# Patient Record
Sex: FEMALE | Race: WHITE | HISPANIC OR LATINO
[De-identification: names, ages, dates, MRNs, and addresses within clinical notes are randomized per-mention and may not be internally consistent; named-entity substitution may affect disease eponyms.]

---

## 2017-02-08 ENCOUNTER — APPOINTMENT (OUTPATIENT)
Dept: ULTRASOUND IMAGING | Facility: CLINIC | Age: 52
End: 2017-02-08

## 2017-02-08 ENCOUNTER — APPOINTMENT (OUTPATIENT)
Dept: MAMMOGRAPHY | Facility: CLINIC | Age: 52
End: 2017-02-08

## 2017-02-19 ENCOUNTER — EMERGENCY (EMERGENCY)
Facility: HOSPITAL | Age: 52
LOS: 1 days | Discharge: HOME | End: 2017-02-19
Attending: EMERGENCY MEDICINE
Payer: MEDICAID

## 2017-02-19 VITALS
SYSTOLIC BLOOD PRESSURE: 114 MMHG | RESPIRATION RATE: 18 BRPM | OXYGEN SATURATION: 100 % | TEMPERATURE: 99 F | DIASTOLIC BLOOD PRESSURE: 71 MMHG | HEART RATE: 102 BPM | HEIGHT: 60 IN | WEIGHT: 117.95 LBS

## 2017-02-19 VITALS
RESPIRATION RATE: 16 BRPM | SYSTOLIC BLOOD PRESSURE: 108 MMHG | HEART RATE: 88 BPM | OXYGEN SATURATION: 100 % | DIASTOLIC BLOOD PRESSURE: 68 MMHG

## 2017-02-19 DIAGNOSIS — L03.313 CELLULITIS OF CHEST WALL: ICD-10-CM

## 2017-02-19 DIAGNOSIS — R06.02 SHORTNESS OF BREATH: ICD-10-CM

## 2017-02-19 PROCEDURE — 93005 ELECTROCARDIOGRAM TRACING: CPT

## 2017-02-19 PROCEDURE — 99284 EMERGENCY DEPT VISIT MOD MDM: CPT

## 2017-02-19 PROCEDURE — 71046 X-RAY EXAM CHEST 2 VIEWS: CPT

## 2017-02-19 PROCEDURE — 99283 EMERGENCY DEPT VISIT LOW MDM: CPT

## 2017-02-19 PROCEDURE — 71020: CPT | Mod: 26

## 2017-02-19 RX ORDER — IBUPROFEN 200 MG
1 TABLET ORAL
Qty: 20 | Refills: 0 | OUTPATIENT
Start: 2017-02-19

## 2017-02-19 RX ADMIN — Medication 100 MILLIGRAM(S): at 19:53

## 2017-02-19 NOTE — ED PROVIDER NOTE - NS ED MD SCRIBE ATTENDING SCRIBE SECTIONS
PHYSICAL EXAM/PAST MEDICAL/SURGICAL/SOCIAL HISTORY/HISTORY OF PRESENT ILLNESS/VITAL SIGNS( Pullset)/DISPOSITION/REVIEW OF SYSTEMS/HIV

## 2017-02-19 NOTE — ED PROVIDER NOTE - RESPIRATORY, MLM
Breath sounds clear and equal bilaterally. Breath sounds clear and equal bilaterally. No respiratory distress.

## 2017-02-19 NOTE — ED PROVIDER NOTE - CHPI ED SYMPTOMS NEG
no tingling/no weakness/no fever/no numbness/no cough, no abdominal pain, no diarrhea, no back pain, no neck pain/no vomiting/no chills/no nausea

## 2017-02-19 NOTE — ED ADULT NURSE NOTE - OBJECTIVE STATEMENT
C/O PAIN TO RIGHT UPPER SCAPULA, RADIATING TO RIGHT BREAST, RIGHT ARM . SEEN AND EXAMINED BY DR. KING. CASE DISCUSSED BY DR. KING WITH PATIENT. RIGHT BREAST WITH AREA OF REDNESS,
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
## 2017-02-19 NOTE — ED PROVIDER NOTE - PHYSICAL EXAMINATION
BREAST EXAM: R: 4x4cm area erythema and induration to outer lower quadrant of R breast BREAST EXAM: R: 4x4cm area erythema, warmth, tenderness, and induration to outer lower quadrant of R breast abutting areola
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
## 2017-03-09 ENCOUNTER — APPOINTMENT (OUTPATIENT)
Dept: SURGERY | Facility: CLINIC | Age: 52
End: 2017-03-09

## 2017-03-09 VITALS
BODY MASS INDEX: 24.74 KG/M2 | SYSTOLIC BLOOD PRESSURE: 98 MMHG | HEIGHT: 60 IN | WEIGHT: 126 LBS | DIASTOLIC BLOOD PRESSURE: 60 MMHG | HEART RATE: 78 BPM

## 2018-03-16 ENCOUNTER — APPOINTMENT (OUTPATIENT)
Dept: ULTRASOUND IMAGING | Facility: CLINIC | Age: 53
End: 2018-03-16
Payer: MEDICAID

## 2018-03-16 ENCOUNTER — APPOINTMENT (OUTPATIENT)
Dept: MAMMOGRAPHY | Facility: CLINIC | Age: 53
End: 2018-03-16
Payer: MEDICAID

## 2018-03-16 ENCOUNTER — OUTPATIENT (OUTPATIENT)
Dept: OUTPATIENT SERVICES | Facility: HOSPITAL | Age: 53
LOS: 1 days | End: 2018-03-16

## 2018-03-16 PROCEDURE — 76641 ULTRASOUND BREAST COMPLETE: CPT | Mod: 26,50

## 2018-03-16 PROCEDURE — 77063 BREAST TOMOSYNTHESIS BI: CPT | Mod: 26

## 2018-03-16 PROCEDURE — 77067 SCR MAMMO BI INCL CAD: CPT | Mod: 26

## 2018-03-28 ENCOUNTER — APPOINTMENT (OUTPATIENT)
Dept: ULTRASOUND IMAGING | Facility: CLINIC | Age: 53
End: 2018-03-28
Payer: MEDICAID

## 2018-03-28 PROCEDURE — 19000 PUNCTURE ASPIR CYST BREAST: CPT | Mod: LT

## 2018-03-28 PROCEDURE — 76942 ECHO GUIDE FOR BIOPSY: CPT | Mod: 26

## 2019-03-22 ENCOUNTER — APPOINTMENT (OUTPATIENT)
Dept: MAMMOGRAPHY | Facility: CLINIC | Age: 54
End: 2019-03-22

## 2019-03-25 ENCOUNTER — APPOINTMENT (OUTPATIENT)
Dept: MAMMOGRAPHY | Facility: CLINIC | Age: 54
End: 2019-03-25
Payer: MEDICAID

## 2019-03-25 ENCOUNTER — OUTPATIENT (OUTPATIENT)
Dept: OUTPATIENT SERVICES | Facility: HOSPITAL | Age: 54
LOS: 1 days | End: 2019-03-25
Payer: MEDICAID

## 2019-03-25 DIAGNOSIS — Z12.31 ENCOUNTER FOR SCREENING MAMMOGRAM FOR MALIGNANT NEOPLASM OF BREAST: ICD-10-CM

## 2019-03-25 PROCEDURE — 77063 BREAST TOMOSYNTHESIS BI: CPT

## 2019-03-25 PROCEDURE — 77067 SCR MAMMO BI INCL CAD: CPT

## 2019-03-25 PROCEDURE — 77067 SCR MAMMO BI INCL CAD: CPT | Mod: 26

## 2019-03-25 PROCEDURE — 77063 BREAST TOMOSYNTHESIS BI: CPT | Mod: 26

## 2020-05-12 NOTE — ED PROVIDER NOTE - CARE PLAN
Video Preceptor Attestation:   Patient seen and discussed with the resident. I have verified the content of the note, which accurately reflects my assessment of the patient and the plan of care.   Supervising Physician:  Whitney Francois MD    Principal Discharge DX:	Cellulitis of chest wall

## 2022-08-19 ENCOUNTER — APPOINTMENT (OUTPATIENT)
Dept: MAMMOGRAPHY | Facility: HOSPITAL | Age: 57
End: 2022-08-19

## 2022-08-19 ENCOUNTER — APPOINTMENT (OUTPATIENT)
Dept: ULTRASOUND IMAGING | Facility: HOSPITAL | Age: 57
End: 2022-08-19

## 2022-08-19 ENCOUNTER — OUTPATIENT (OUTPATIENT)
Dept: OUTPATIENT SERVICES | Facility: HOSPITAL | Age: 57
LOS: 1 days | End: 2022-08-19
Payer: MEDICAID

## 2022-08-19 PROCEDURE — 77063 BREAST TOMOSYNTHESIS BI: CPT | Mod: 26

## 2022-08-19 PROCEDURE — 77080 DXA BONE DENSITY AXIAL: CPT

## 2022-08-19 PROCEDURE — 77067 SCR MAMMO BI INCL CAD: CPT

## 2022-08-19 PROCEDURE — 77080 DXA BONE DENSITY AXIAL: CPT | Mod: 26

## 2022-08-19 PROCEDURE — 77067 SCR MAMMO BI INCL CAD: CPT | Mod: 26

## 2022-08-19 PROCEDURE — 77063 BREAST TOMOSYNTHESIS BI: CPT

## 2022-09-02 ENCOUNTER — APPOINTMENT (OUTPATIENT)
Dept: MAMMOGRAPHY | Facility: HOSPITAL | Age: 57
End: 2022-09-02

## 2022-09-02 ENCOUNTER — APPOINTMENT (OUTPATIENT)
Dept: ULTRASOUND IMAGING | Facility: HOSPITAL | Age: 57
End: 2022-09-02

## 2022-09-02 ENCOUNTER — OUTPATIENT (OUTPATIENT)
Dept: OUTPATIENT SERVICES | Facility: HOSPITAL | Age: 57
LOS: 1 days | End: 2022-09-02
Payer: MEDICAID

## 2022-09-02 PROCEDURE — 77065 DX MAMMO INCL CAD UNI: CPT | Mod: 26,RT

## 2022-09-02 PROCEDURE — 76641 ULTRASOUND BREAST COMPLETE: CPT | Mod: 26,50

## 2022-09-02 PROCEDURE — G0279: CPT

## 2022-09-02 PROCEDURE — 77065 DX MAMMO INCL CAD UNI: CPT

## 2022-09-02 PROCEDURE — 76641 ULTRASOUND BREAST COMPLETE: CPT

## 2022-09-02 PROCEDURE — G0279: CPT | Mod: 26

## 2022-09-07 DIAGNOSIS — N60.02 SOLITARY CYST OF LEFT BREAST: ICD-10-CM

## 2022-09-07 DIAGNOSIS — N64.89 OTHER SPECIFIED DISORDERS OF BREAST: ICD-10-CM

## 2022-09-07 DIAGNOSIS — R92.2 INCONCLUSIVE MAMMOGRAM: ICD-10-CM

## 2022-09-07 DIAGNOSIS — N60.01 SOLITARY CYST OF RIGHT BREAST: ICD-10-CM

## 2022-11-18 ENCOUNTER — APPOINTMENT (OUTPATIENT)
Dept: MRI IMAGING | Facility: HOSPITAL | Age: 57
End: 2022-11-18

## 2022-11-18 ENCOUNTER — OUTPATIENT (OUTPATIENT)
Dept: OUTPATIENT SERVICES | Facility: HOSPITAL | Age: 57
LOS: 1 days | End: 2022-11-18
Payer: MEDICAID

## 2022-11-18 PROCEDURE — C8937: CPT

## 2022-11-18 PROCEDURE — A9585: CPT

## 2022-11-18 PROCEDURE — C8908: CPT

## 2022-11-18 PROCEDURE — 77049 MRI BREAST C-+ W/CAD BI: CPT | Mod: 26

## 2024-01-05 ENCOUNTER — EMERGENCY (EMERGENCY)
Facility: HOSPITAL | Age: 59
LOS: 1 days | Discharge: HOME | End: 2024-01-05
Attending: EMERGENCY MEDICINE
Payer: MEDICAID

## 2024-01-05 VITALS
WEIGHT: 164.91 LBS | TEMPERATURE: 98 F | OXYGEN SATURATION: 97 % | SYSTOLIC BLOOD PRESSURE: 140 MMHG | HEART RATE: 113 BPM | DIASTOLIC BLOOD PRESSURE: 84 MMHG | RESPIRATION RATE: 20 BRPM

## 2024-01-05 LAB
ALBUMIN SERPL ELPH-MCNC: 4.1 G/DL — SIGNIFICANT CHANGE UP (ref 3.3–5)
ALBUMIN SERPL ELPH-MCNC: 4.1 G/DL — SIGNIFICANT CHANGE UP (ref 3.3–5)
ALP SERPL-CCNC: 79 U/L — SIGNIFICANT CHANGE UP (ref 40–120)
ALP SERPL-CCNC: 79 U/L — SIGNIFICANT CHANGE UP (ref 40–120)
ALT FLD-CCNC: 40 U/L — SIGNIFICANT CHANGE UP (ref 10–45)
ALT FLD-CCNC: 40 U/L — SIGNIFICANT CHANGE UP (ref 10–45)
ANION GAP SERPL CALC-SCNC: 10 MMOL/L — SIGNIFICANT CHANGE UP (ref 5–17)
ANION GAP SERPL CALC-SCNC: 10 MMOL/L — SIGNIFICANT CHANGE UP (ref 5–17)
APPEARANCE UR: CLEAR — SIGNIFICANT CHANGE UP
APPEARANCE UR: CLEAR — SIGNIFICANT CHANGE UP
AST SERPL-CCNC: 29 U/L — SIGNIFICANT CHANGE UP (ref 10–40)
AST SERPL-CCNC: 29 U/L — SIGNIFICANT CHANGE UP (ref 10–40)
BACTERIA # UR AUTO: NEGATIVE /HPF — SIGNIFICANT CHANGE UP
BACTERIA # UR AUTO: NEGATIVE /HPF — SIGNIFICANT CHANGE UP
BASOPHILS # BLD AUTO: 0.05 K/UL — SIGNIFICANT CHANGE UP (ref 0–0.2)
BASOPHILS # BLD AUTO: 0.05 K/UL — SIGNIFICANT CHANGE UP (ref 0–0.2)
BASOPHILS NFR BLD AUTO: 0.6 % — SIGNIFICANT CHANGE UP (ref 0–2)
BASOPHILS NFR BLD AUTO: 0.6 % — SIGNIFICANT CHANGE UP (ref 0–2)
BILIRUB SERPL-MCNC: 0.4 MG/DL — SIGNIFICANT CHANGE UP (ref 0.2–1.2)
BILIRUB SERPL-MCNC: 0.4 MG/DL — SIGNIFICANT CHANGE UP (ref 0.2–1.2)
BILIRUB UR-MCNC: NEGATIVE — SIGNIFICANT CHANGE UP
BILIRUB UR-MCNC: NEGATIVE — SIGNIFICANT CHANGE UP
BUN SERPL-MCNC: 17 MG/DL — SIGNIFICANT CHANGE UP (ref 7–23)
BUN SERPL-MCNC: 17 MG/DL — SIGNIFICANT CHANGE UP (ref 7–23)
CALCIUM SERPL-MCNC: 10 MG/DL — SIGNIFICANT CHANGE UP (ref 8.4–10.5)
CALCIUM SERPL-MCNC: 10 MG/DL — SIGNIFICANT CHANGE UP (ref 8.4–10.5)
CAST: 0 /LPF — SIGNIFICANT CHANGE UP (ref 0–4)
CAST: 0 /LPF — SIGNIFICANT CHANGE UP (ref 0–4)
CHLORIDE SERPL-SCNC: 106 MMOL/L — SIGNIFICANT CHANGE UP (ref 96–108)
CHLORIDE SERPL-SCNC: 106 MMOL/L — SIGNIFICANT CHANGE UP (ref 96–108)
CO2 SERPL-SCNC: 25 MMOL/L — SIGNIFICANT CHANGE UP (ref 22–31)
CO2 SERPL-SCNC: 25 MMOL/L — SIGNIFICANT CHANGE UP (ref 22–31)
COLOR SPEC: YELLOW — SIGNIFICANT CHANGE UP
COLOR SPEC: YELLOW — SIGNIFICANT CHANGE UP
CREAT SERPL-MCNC: 0.73 MG/DL — SIGNIFICANT CHANGE UP (ref 0.5–1.3)
CREAT SERPL-MCNC: 0.73 MG/DL — SIGNIFICANT CHANGE UP (ref 0.5–1.3)
DIFF PNL FLD: ABNORMAL
DIFF PNL FLD: ABNORMAL
EGFR: 95 ML/MIN/1.73M2 — SIGNIFICANT CHANGE UP
EGFR: 95 ML/MIN/1.73M2 — SIGNIFICANT CHANGE UP
EOSINOPHIL # BLD AUTO: 0.33 K/UL — SIGNIFICANT CHANGE UP (ref 0–0.5)
EOSINOPHIL # BLD AUTO: 0.33 K/UL — SIGNIFICANT CHANGE UP (ref 0–0.5)
EOSINOPHIL NFR BLD AUTO: 4.2 % — SIGNIFICANT CHANGE UP (ref 0–6)
EOSINOPHIL NFR BLD AUTO: 4.2 % — SIGNIFICANT CHANGE UP (ref 0–6)
GLUCOSE SERPL-MCNC: 92 MG/DL — SIGNIFICANT CHANGE UP (ref 70–99)
GLUCOSE SERPL-MCNC: 92 MG/DL — SIGNIFICANT CHANGE UP (ref 70–99)
GLUCOSE UR QL: NEGATIVE MG/DL — SIGNIFICANT CHANGE UP
GLUCOSE UR QL: NEGATIVE MG/DL — SIGNIFICANT CHANGE UP
HCT VFR BLD CALC: 42 % — SIGNIFICANT CHANGE UP (ref 34.5–45)
HCT VFR BLD CALC: 42 % — SIGNIFICANT CHANGE UP (ref 34.5–45)
HGB BLD-MCNC: 14.1 G/DL — SIGNIFICANT CHANGE UP (ref 11.5–15.5)
HGB BLD-MCNC: 14.1 G/DL — SIGNIFICANT CHANGE UP (ref 11.5–15.5)
IMM GRANULOCYTES NFR BLD AUTO: 0.4 % — SIGNIFICANT CHANGE UP (ref 0–0.9)
IMM GRANULOCYTES NFR BLD AUTO: 0.4 % — SIGNIFICANT CHANGE UP (ref 0–0.9)
KETONES UR-MCNC: NEGATIVE MG/DL — SIGNIFICANT CHANGE UP
KETONES UR-MCNC: NEGATIVE MG/DL — SIGNIFICANT CHANGE UP
LEUKOCYTE ESTERASE UR-ACNC: NEGATIVE — SIGNIFICANT CHANGE UP
LEUKOCYTE ESTERASE UR-ACNC: NEGATIVE — SIGNIFICANT CHANGE UP
LYMPHOCYTES # BLD AUTO: 2.77 K/UL — SIGNIFICANT CHANGE UP (ref 1–3.3)
LYMPHOCYTES # BLD AUTO: 2.77 K/UL — SIGNIFICANT CHANGE UP (ref 1–3.3)
LYMPHOCYTES # BLD AUTO: 35 % — SIGNIFICANT CHANGE UP (ref 13–44)
LYMPHOCYTES # BLD AUTO: 35 % — SIGNIFICANT CHANGE UP (ref 13–44)
MCHC RBC-ENTMCNC: 29.6 PG — SIGNIFICANT CHANGE UP (ref 27–34)
MCHC RBC-ENTMCNC: 29.6 PG — SIGNIFICANT CHANGE UP (ref 27–34)
MCHC RBC-ENTMCNC: 33.6 GM/DL — SIGNIFICANT CHANGE UP (ref 32–36)
MCHC RBC-ENTMCNC: 33.6 GM/DL — SIGNIFICANT CHANGE UP (ref 32–36)
MCV RBC AUTO: 88.1 FL — SIGNIFICANT CHANGE UP (ref 80–100)
MCV RBC AUTO: 88.1 FL — SIGNIFICANT CHANGE UP (ref 80–100)
MONOCYTES # BLD AUTO: 0.57 K/UL — SIGNIFICANT CHANGE UP (ref 0–0.9)
MONOCYTES # BLD AUTO: 0.57 K/UL — SIGNIFICANT CHANGE UP (ref 0–0.9)
MONOCYTES NFR BLD AUTO: 7.2 % — SIGNIFICANT CHANGE UP (ref 2–14)
MONOCYTES NFR BLD AUTO: 7.2 % — SIGNIFICANT CHANGE UP (ref 2–14)
NEUTROPHILS # BLD AUTO: 4.16 K/UL — SIGNIFICANT CHANGE UP (ref 1.8–7.4)
NEUTROPHILS # BLD AUTO: 4.16 K/UL — SIGNIFICANT CHANGE UP (ref 1.8–7.4)
NEUTROPHILS NFR BLD AUTO: 52.6 % — SIGNIFICANT CHANGE UP (ref 43–77)
NEUTROPHILS NFR BLD AUTO: 52.6 % — SIGNIFICANT CHANGE UP (ref 43–77)
NITRITE UR-MCNC: NEGATIVE — SIGNIFICANT CHANGE UP
NITRITE UR-MCNC: NEGATIVE — SIGNIFICANT CHANGE UP
NRBC # BLD: 0 /100 WBCS — SIGNIFICANT CHANGE UP (ref 0–0)
NRBC # BLD: 0 /100 WBCS — SIGNIFICANT CHANGE UP (ref 0–0)
PH UR: 6 — SIGNIFICANT CHANGE UP (ref 5–8)
PH UR: 6 — SIGNIFICANT CHANGE UP (ref 5–8)
PLATELET # BLD AUTO: 302 K/UL — SIGNIFICANT CHANGE UP (ref 150–400)
PLATELET # BLD AUTO: 302 K/UL — SIGNIFICANT CHANGE UP (ref 150–400)
POTASSIUM SERPL-MCNC: 4.7 MMOL/L — SIGNIFICANT CHANGE UP (ref 3.5–5.3)
POTASSIUM SERPL-MCNC: 4.7 MMOL/L — SIGNIFICANT CHANGE UP (ref 3.5–5.3)
POTASSIUM SERPL-SCNC: 4.7 MMOL/L — SIGNIFICANT CHANGE UP (ref 3.5–5.3)
POTASSIUM SERPL-SCNC: 4.7 MMOL/L — SIGNIFICANT CHANGE UP (ref 3.5–5.3)
PROT SERPL-MCNC: 7 G/DL — SIGNIFICANT CHANGE UP (ref 6–8.3)
PROT SERPL-MCNC: 7 G/DL — SIGNIFICANT CHANGE UP (ref 6–8.3)
PROT UR-MCNC: NEGATIVE MG/DL — SIGNIFICANT CHANGE UP
PROT UR-MCNC: NEGATIVE MG/DL — SIGNIFICANT CHANGE UP
RBC # BLD: 4.77 M/UL — SIGNIFICANT CHANGE UP (ref 3.8–5.2)
RBC # BLD: 4.77 M/UL — SIGNIFICANT CHANGE UP (ref 3.8–5.2)
RBC # FLD: 11.9 % — SIGNIFICANT CHANGE UP (ref 10.3–14.5)
RBC # FLD: 11.9 % — SIGNIFICANT CHANGE UP (ref 10.3–14.5)
RBC CASTS # UR COMP ASSIST: 2 /HPF — SIGNIFICANT CHANGE UP (ref 0–4)
RBC CASTS # UR COMP ASSIST: 2 /HPF — SIGNIFICANT CHANGE UP (ref 0–4)
REVIEW: SIGNIFICANT CHANGE UP
REVIEW: SIGNIFICANT CHANGE UP
SODIUM SERPL-SCNC: 141 MMOL/L — SIGNIFICANT CHANGE UP (ref 135–145)
SODIUM SERPL-SCNC: 141 MMOL/L — SIGNIFICANT CHANGE UP (ref 135–145)
SP GR SPEC: 1.02 — SIGNIFICANT CHANGE UP (ref 1–1.03)
SP GR SPEC: 1.02 — SIGNIFICANT CHANGE UP (ref 1–1.03)
SQUAMOUS # UR AUTO: 1 /HPF — SIGNIFICANT CHANGE UP (ref 0–5)
SQUAMOUS # UR AUTO: 1 /HPF — SIGNIFICANT CHANGE UP (ref 0–5)
UROBILINOGEN FLD QL: 0.2 MG/DL — SIGNIFICANT CHANGE UP (ref 0.2–1)
UROBILINOGEN FLD QL: 0.2 MG/DL — SIGNIFICANT CHANGE UP (ref 0.2–1)
WBC # BLD: 7.91 K/UL — SIGNIFICANT CHANGE UP (ref 3.8–10.5)
WBC # BLD: 7.91 K/UL — SIGNIFICANT CHANGE UP (ref 3.8–10.5)
WBC # FLD AUTO: 7.91 K/UL — SIGNIFICANT CHANGE UP (ref 3.8–10.5)
WBC # FLD AUTO: 7.91 K/UL — SIGNIFICANT CHANGE UP (ref 3.8–10.5)
WBC UR QL: 2 /HPF — SIGNIFICANT CHANGE UP (ref 0–5)
WBC UR QL: 2 /HPF — SIGNIFICANT CHANGE UP (ref 0–5)

## 2024-01-05 PROCEDURE — 99285 EMERGENCY DEPT VISIT HI MDM: CPT

## 2024-01-05 RX ORDER — ACETAMINOPHEN 500 MG
1000 TABLET ORAL ONCE
Refills: 0 | Status: COMPLETED | OUTPATIENT
Start: 2024-01-05 | End: 2024-01-05

## 2024-01-05 RX ORDER — SODIUM CHLORIDE 9 MG/ML
1000 INJECTION INTRAMUSCULAR; INTRAVENOUS; SUBCUTANEOUS ONCE
Refills: 0 | Status: COMPLETED | OUTPATIENT
Start: 2024-01-05 | End: 2024-01-05

## 2024-01-05 RX ORDER — FAMOTIDINE 10 MG/ML
20 INJECTION INTRAVENOUS ONCE
Refills: 0 | Status: COMPLETED | OUTPATIENT
Start: 2024-01-05 | End: 2024-01-05

## 2024-01-05 RX ADMIN — SODIUM CHLORIDE 1000 MILLILITER(S): 9 INJECTION INTRAMUSCULAR; INTRAVENOUS; SUBCUTANEOUS at 21:02

## 2024-01-05 RX ADMIN — FAMOTIDINE 20 MILLIGRAM(S): 10 INJECTION INTRAVENOUS at 21:02

## 2024-01-05 RX ADMIN — Medication 400 MILLIGRAM(S): at 21:02

## 2024-01-05 NOTE — ED ADULT NURSE NOTE - OBJECTIVE STATEMENT
58 year old female, A&Ox4, presenting to ED complaining of flank pain. States pain started this morning around 5 am. States pain was initially 8/10 but is currently 5/10. Also endorsing some nausea. Has a history of renal stones, denies any difficulty urinating or blood in the urine. Denies CP, SOB, HA, abdominal pain, difficulty urinating. Heart rate regular. Respirations clear and equal bilaterally. Abdomen soft, nontender and nondistended. Peripheral pulses strong and equal bilaterally. IV placed and labs drawn. Safety and comfort measures maintained.

## 2024-01-05 NOTE — ED PROVIDER NOTE - CLINICAL SUMMARY MEDICAL DECISION MAKING FREE TEXT BOX
Attending note.  See differential above.  Labs, lipase, urinalysis, analgesia, CT scan with contrast, analgesia, Zofran and reassess.

## 2024-01-05 NOTE — ED PROVIDER NOTE - PHYSICAL EXAMINATION
Attn - alert, NAD, no pallor or jaundice, PERRL 3 mm, moist mm, skin - warm and dry, Lungs - clear, no w/r/r, good BS bilaterally, Cor - rr, no M, no rub, Abdo - ND, soft, tender abdo with max on left side, no HSM, no CVAT, no guarding or rebound. No rash or lesions on flank. no hernia.  Extremities - no edema, no calf tenderness, distal pulses intact and symmetrical, Neuro - intact and non-focal Attn - alert, NAD, no pallor or jaundice, PERRL 3 mm, moist mm, skin - warm and dry, Lungs - clear, no w/r/r, good BS bilaterally, Cor - rr, no M, no rub, Abdo - ND, soft, tender abdo with max on left side, no HSM, no CVAT, no guarding or rebound. No rash or lesions on flank. no hernia.  Extremities - no edema, no calf tenderness, distal pulses intact and symmetrical, Neuro - intact and non-focal    -Kevin Mock PA-C   GEN: Pt in NAD  PSYCH: Affect appropriate.  EYES: Sclera white w/o injection.   ENT: Head NCAT. MMM. Neck supple FROM.  RESP: CTA b/l, no wheezes, rales, or rhonchi.   CARDIAC: RRR, clear distinct S1, S2, no appreciable murmurs.  ABD: Abdomen soft, +LLQ ttp. No CVAT b/l.  VASC: 2+ radial and dorsalis pedis pulses b/l. No edema or calf tenderness.  SKIN: No rashes on the trunk.

## 2024-01-05 NOTE — ED PROVIDER NOTE - NSFOLLOWUPINSTRUCTIONS_ED_ALL_ED_FT
DIVERTICULITIS:  Follow-up with your primary care provider within 2-3 days.  Additionally follow-up with a gastroenterologist within 1 week.    Bring all printed results to discuss with your PCP.    Read all discharge paperwork, adhere to clear liquid diet for the remainder of today and begin soft foods when tolerated tomorrow.    Take your antibiotics as prescribed, finish the full course of antibiotics.  Continue to take all other medications as directed.    Return to the emergency room immediately if your symptoms worsen or persist, or if you have fever (100.4'F), worsening abdominal pain, vomiting and unable to keep food down, loss of consciousness (passing out) or if any other concerning or questionable symptoms.     --------------------  KIDNEY STONE:  1) Follow-up with your primary care provider within 2-3 days.       Follow-up with urology in 2-3 days.       Bring all printed results to discuss.    2) Pain can be managed with Acetaminophen 975mg (3 regular strength tablets) every 6 hours and Ibuprofen 600mg (3 regular strength pills) every 8 hours.    3) Make sure to stay hydrated. Take Flomax 0.4mg at night. Continue to strain urine, if you collect a stone bring it to your urologist for evaluation.    4) Continue to take all other medications as directed.    5) Return to the emergency room immediately if your symptoms worsen or persist, or if you have a high or concerning fever, back pain, abdominal pain, severe vomiting, difficulty urinating, pain with urination, new rash, discharge, or if any other concerning or questionable symptoms.

## 2024-01-05 NOTE — ED ADULT NURSE NOTE - NSFALLUNIVINTERV_ED_ALL_ED
Bed/Stretcher in lowest position, wheels locked, appropriate side rails in place/Call bell, personal items and telephone in reach/Instruct patient to call for assistance before getting out of bed/chair/stretcher/Non-slip footwear applied when patient is off stretcher/[place] to call system/Physically safe environment - no spills, clutter or unnecessary equipment/Purposeful proactive rounding/Room/bathroom lighting operational, light cord in reach Bed/Stretcher in lowest position, wheels locked, appropriate side rails in place/Call bell, personal items and telephone in reach/Instruct patient to call for assistance before getting out of bed/chair/stretcher/Non-slip footwear applied when patient is off stretcher/[place] to call system/Physically safe environment - no spills, clutter or unnecessary equipment/Purposeful proactive rounding/Room/bathroom lighting operational, light cord in reach

## 2024-01-05 NOTE — ED PROVIDER NOTE - PATIENT PORTAL LINK FT
You can access the FollowMyHealth Patient Portal offered by [facility] by registering at the following website: [email or web address]. By joining [facility]’s FollowMyHealth portal, you will also be able to view your health information using other applications (apps) compatible with our system. You can access the FollowMyHealth Patient Portal offered by [facility] by registering at the following website: [email or web address]. By joining [facility]’s FollowMyHealth portal, you will also be able to view your health information using other applications (apps) compatible with our system.

## 2024-01-05 NOTE — ED PROVIDER NOTE - DIFFERENTIAL DIAGNOSIS
Differential Diagnosis Patient with left flank pain with differential not limited to renal colic versus diverticulitis versus constipation versus pyelonephritis versus gastritis/ulcer

## 2024-01-05 NOTE — ED PROVIDER NOTE - PROGRESS NOTE DETAILS
[facility] ED Attending- Patient feels well, tolerating PO. Discussed lab and radiology findings with patient. Patient feels comfortable going home. Gave home care and follow up instructions. Discussed which symptoms to look out for and when to return to the ED for further evaluation. Patient given opportunity to ask questions about their medical condition and had all questions answered. [facility] ED Attending- Patient feels well, tolerating PO. Discussed lab and radiology findings with patient. Patient feels comfortable going home. Gave home care and follow up instructions. Discussed which symptoms to look out for and when to return to the ED for further evaluation. Patient given opportunity to ask questions about their medical condition and had all questions answered.

## 2024-01-05 NOTE — ED PROVIDER NOTE - OBJECTIVE STATEMENT
Attending note.  Patient was seen in [facility] #4.  Patient complains of left flank pain which began at 5 AM this morning, somewhat improved and got worse this afternoon.  Patient reports pain was 8–9/10 when arrived in the emergency department 4 hours ago.  She reports some nausea when pain was severe.  Pain is currently 5-6/10.  She denies any nausea, fevers, chills, sweats, rigors.  She does report being constipated for the last 2 days.  She has a history of renal stones in the past and initially pain felt the same.  She denies any GI bleeding, hematuria or dysuria.  She denies any vomiting.  She denies any respiratory symptoms or coughing.  She denies any abdominal surgery in the past.  She has no significant past medical history and takes no medications regularly.  She does not smoke or drink alcohol.  She has no allergies to medications.  She feels slightly distended.  She reports a normal colonoscopy 1 year ago.   #[id number] was utilized for history and to assist in physical exam Attending note.  Patient was seen in [facility] #4.  Patient complains of left flank pain which began at 5 AM this morning, somewhat improved and got worse this afternoon.  Patient reports pain was 8–9/10 when arrived in the emergency department 4 hours ago.  She reports some nausea when pain was severe.  Pain is currently 5-6/10.  She denies any nausea, fevers, chills, sweats, rigors.  She does report being constipated for the last 2 days.  She has a history of renal stones in the past and initially pain felt the same.  She denies any GI bleeding, hematuria or dysuria.  She denies any vomiting.  She denies any respiratory symptoms or coughing.  She denies any abdominal surgery in the past.  She has no significant past medical history and takes no medications regularly.  She does not smoke or drink alcohol.  She has no allergies to medications.  She feels slightly distended.  She reports a normal colonoscopy 1 year ago.   #[id number] was utilized for history and to assist in physical exam Attending note.  [facility]  Patient complains of left flank pain which began at 5 AM this morning, somewhat improved and got worse this afternoon.  Patient reports pain was 8–9/10 when arrived in the emergency department 4 hours ago.  She reports some nausea when pain was severe.  Pain is currently 5-6/10.  She denies any nausea, fevers, chills, sweats, rigors.  She does report being constipated for the last 2 days.  She has a history of renal stones in the past and initially pain felt the same.  She denies any GI bleeding, hematuria or dysuria.  She denies any vomiting.  She denies any respiratory symptoms or coughing.  She denies any abdominal surgery in the past.  She has no significant past medical history and takes no medications regularly.  She does not smoke or drink alcohol.  She has no allergies to medications.  She feels slightly distended.  She reports a normal colonoscopy 1 year ago.   #[id number] was utilized for history and to assist in physical exam    58-year-old female PMH of kidney stones all past spontaneously last episode 2 years ago, PSH of appendectomy presents emergency department complaining of sudden onset left flank and left lower quadrant pain which began at 5 AM this morning, patient reports she had another bout of severe pain at 2 PM today prompting ED visit.  Patient reports pain currently is rated a 6 out of 10.  Pain is sharp described as "inflammation".  Patient reports pain feels somewhat similar to prior episodes of kidney stone pain but is also different in some ways.  Patient reports pain may be postprandial in nature.  Patient notes when pain is severe she experiences nausea but does not have nausea at this time.  Patient also reports constipation last stool was 2 days ago, normal brown BM denies melena or hematochezia.  Patient had colonoscopy last year reportedly showing "inflammation".  Patient denies diarrhea, vomiting, dysuria, hematuria, urinary frequency, chest pain, shortness of breath, fevers, chills. Attending note.  [facility]  Patient complains of left flank pain which began at 5 AM this morning, somewhat improved and got worse this afternoon.  Patient reports pain was 8–9/10 when arrived in the emergency department 4 hours ago.  She reports some nausea when pain was severe.  Pain is currently 5-6/10.  She denies any nausea, fevers, chills, sweats, rigors.  She does report being constipated for the last 2 days.  She has a history of renal stones in the past and initially pain felt the same.  She denies any GI bleeding, hematuria or dysuria.  She denies any vomiting.  She denies any respiratory symptoms or coughing.  She denies any abdominal surgery in the past.  She has no significant past medical history and takes no medications regularly.  She does not smoke or drink alcohol.  She has no allergies to medications.  She feels slightly distended.  She reports a normal colonoscopy 1 year ago.   #[id number] was utilized for history and to assist in physical exam    58-year-old female PMH of kidney stones all past spontaneously last episode 2 years ago, PSH of appendectomy presents emergency department complaining of sudden onset left flank and left lower quadrant pain which began at 5 AM this morning, patient reports she had another bout of severe pain at 2 PM today prompting ED visit.  Patient reports pain currently is rated a 6 out of 10.  Pain is sharp described as "inflammation".  Patient reports pain feels somewhat similar to prior episodes of kidney stone pain but is also different in some ways.  Patient reports pain may be postprandial in nature.  Patient notes when pain is severe she experiences nausea but does not have nausea at this time.  Patient also reports constipation last stool was 2 days ago, normal brown BM denies melena or hematochezia.  Patient had colonoscopy last year reportedly showing "inflammation".  Patient denies diarrhea, vomiting, dysuria, hematuria, urinary frequency, chest pain, shortness of breath, fevers, chills.

## 2024-01-06 VITALS
RESPIRATION RATE: 18 BRPM | TEMPERATURE: 98 F | DIASTOLIC BLOOD PRESSURE: 69 MMHG | HEART RATE: 68 BPM | SYSTOLIC BLOOD PRESSURE: 106 MMHG | OXYGEN SATURATION: 96 %

## 2024-01-06 PROCEDURE — 74177 CT ABD & PELVIS W/CONTRAST: CPT | Mod: MA

## 2024-01-06 PROCEDURE — 96374 THER/PROPH/DIAG INJ IV PUSH: CPT | Mod: XU

## 2024-01-06 PROCEDURE — 36415 COLL VENOUS BLD VENIPUNCTURE: CPT

## 2024-01-06 PROCEDURE — 80053 COMPREHEN METABOLIC PANEL: CPT

## 2024-01-06 PROCEDURE — 87086 URINE CULTURE/COLONY COUNT: CPT

## 2024-01-06 PROCEDURE — 96375 TX/PRO/DX INJ NEW DRUG ADDON: CPT

## 2024-01-06 PROCEDURE — 74177 CT ABD & PELVIS W/CONTRAST: CPT | Mod: 26,MA

## 2024-01-06 PROCEDURE — 99284 EMERGENCY DEPT VISIT MOD MDM: CPT | Mod: 25

## 2024-01-06 PROCEDURE — 81001 URINALYSIS AUTO W/SCOPE: CPT

## 2024-01-06 PROCEDURE — 85025 COMPLETE CBC W/AUTO DIFF WBC: CPT

## 2024-01-07 LAB
CULTURE RESULTS: SIGNIFICANT CHANGE UP
CULTURE RESULTS: SIGNIFICANT CHANGE UP
SPECIMEN SOURCE: SIGNIFICANT CHANGE UP
SPECIMEN SOURCE: SIGNIFICANT CHANGE UP

## 2024-06-06 NOTE — ED ADULT NURSE NOTE - CHIEF COMPLAINT
Pt arrived to floor from PACU. Aox4, on 3L nc, drowsy. Denies pain, sob, ha, n/v/d, cp. Hemovac x2 in place, gray cath draining clear yellow urine. Denies needs ATT.    The patient is a 58y Female complaining of pain, flank.

## 2024-12-26 NOTE — ED PROVIDER NOTE - EKG #1 DATE/TIME
Pt arrives to ed via triage d.t. needing two staples removed to back of head that were placed last Friday.   19-Feb-2017 19:07

## 2025-03-24 ENCOUNTER — NON-APPOINTMENT (OUTPATIENT)
Age: 60
End: 2025-03-24

## 2025-03-24 ENCOUNTER — INPATIENT (INPATIENT)
Facility: HOSPITAL | Age: 60
LOS: 1 days | Discharge: HOME | DRG: 392 | End: 2025-03-26
Attending: STUDENT IN AN ORGANIZED HEALTH CARE EDUCATION/TRAINING PROGRAM | Admitting: STUDENT IN AN ORGANIZED HEALTH CARE EDUCATION/TRAINING PROGRAM
Payer: MEDICAID

## 2025-03-24 VITALS
WEIGHT: 128.09 LBS | DIASTOLIC BLOOD PRESSURE: 84 MMHG | OXYGEN SATURATION: 100 % | TEMPERATURE: 99 F | RESPIRATION RATE: 18 BRPM | HEART RATE: 115 BPM | HEIGHT: 59.84 IN | SYSTOLIC BLOOD PRESSURE: 122 MMHG

## 2025-03-24 LAB
ALBUMIN SERPL ELPH-MCNC: 4.3 G/DL — SIGNIFICANT CHANGE UP (ref 3.3–5)
ALP SERPL-CCNC: 139 U/L — HIGH (ref 40–120)
ALT FLD-CCNC: 403 U/L — HIGH (ref 10–45)
ANION GAP SERPL CALC-SCNC: 16 MMOL/L — SIGNIFICANT CHANGE UP (ref 5–17)
APAP SERPL-MCNC: 22 UG/ML — SIGNIFICANT CHANGE UP (ref 10–30)
APTT BLD: 31.3 SEC — SIGNIFICANT CHANGE UP (ref 24.5–35.6)
AST SERPL-CCNC: 508 U/L — HIGH (ref 10–40)
BASOPHILS # BLD AUTO: 0.02 K/UL — SIGNIFICANT CHANGE UP (ref 0–0.2)
BASOPHILS NFR BLD AUTO: 0.4 % — SIGNIFICANT CHANGE UP (ref 0–2)
BILIRUB SERPL-MCNC: 1 MG/DL — SIGNIFICANT CHANGE UP (ref 0.2–1.2)
BUN SERPL-MCNC: 11 MG/DL — SIGNIFICANT CHANGE UP (ref 7–23)
CALCIUM SERPL-MCNC: 9.2 MG/DL — SIGNIFICANT CHANGE UP (ref 8.4–10.5)
CHLORIDE SERPL-SCNC: 102 MMOL/L — SIGNIFICANT CHANGE UP (ref 96–108)
CO2 SERPL-SCNC: 20 MMOL/L — LOW (ref 22–31)
CREAT SERPL-MCNC: 0.75 MG/DL — SIGNIFICANT CHANGE UP (ref 0.5–1.3)
EGFR: 91 ML/MIN/1.73M2 — SIGNIFICANT CHANGE UP
EGFR: 91 ML/MIN/1.73M2 — SIGNIFICANT CHANGE UP
EOSINOPHIL # BLD AUTO: 0.33 K/UL — SIGNIFICANT CHANGE UP (ref 0–0.5)
EOSINOPHIL NFR BLD AUTO: 5.8 % — SIGNIFICANT CHANGE UP (ref 0–6)
FLUAV AG NPH QL: SIGNIFICANT CHANGE UP
FLUBV AG NPH QL: SIGNIFICANT CHANGE UP
GAS PNL BLDV: SIGNIFICANT CHANGE UP
GLUCOSE SERPL-MCNC: 97 MG/DL — SIGNIFICANT CHANGE UP (ref 70–99)
HCT VFR BLD CALC: 44.1 % — SIGNIFICANT CHANGE UP (ref 34.5–45)
HGB BLD-MCNC: 14.9 G/DL — SIGNIFICANT CHANGE UP (ref 11.5–15.5)
IMM GRANULOCYTES NFR BLD AUTO: 0.4 % — SIGNIFICANT CHANGE UP (ref 0–0.9)
INR BLD: 1.04 RATIO — SIGNIFICANT CHANGE UP (ref 0.85–1.16)
LYMPHOCYTES # BLD AUTO: 0.74 K/UL — LOW (ref 1–3.3)
LYMPHOCYTES # BLD AUTO: 13 % — SIGNIFICANT CHANGE UP (ref 13–44)
MCHC RBC-ENTMCNC: 29.6 PG — SIGNIFICANT CHANGE UP (ref 27–34)
MCHC RBC-ENTMCNC: 33.8 G/DL — SIGNIFICANT CHANGE UP (ref 32–36)
MCV RBC AUTO: 87.5 FL — SIGNIFICANT CHANGE UP (ref 80–100)
MONOCYTES # BLD AUTO: 0.49 K/UL — SIGNIFICANT CHANGE UP (ref 0–0.9)
MONOCYTES NFR BLD AUTO: 8.6 % — SIGNIFICANT CHANGE UP (ref 2–14)
NEUTROPHILS # BLD AUTO: 4.08 K/UL — SIGNIFICANT CHANGE UP (ref 1.8–7.4)
NEUTROPHILS NFR BLD AUTO: 71.8 % — SIGNIFICANT CHANGE UP (ref 43–77)
NRBC BLD AUTO-RTO: 0 /100 WBCS — SIGNIFICANT CHANGE UP (ref 0–0)
PLATELET # BLD AUTO: 254 K/UL — SIGNIFICANT CHANGE UP (ref 150–400)
POTASSIUM SERPL-MCNC: 3.8 MMOL/L — SIGNIFICANT CHANGE UP (ref 3.5–5.3)
POTASSIUM SERPL-SCNC: 3.8 MMOL/L — SIGNIFICANT CHANGE UP (ref 3.5–5.3)
PROT SERPL-MCNC: 7.6 G/DL — SIGNIFICANT CHANGE UP (ref 6–8.3)
PROTHROM AB SERPL-ACNC: 11.9 SEC — SIGNIFICANT CHANGE UP (ref 9.9–13.4)
RBC # BLD: 5.04 M/UL — SIGNIFICANT CHANGE UP (ref 3.8–5.2)
RBC # FLD: 12.2 % — SIGNIFICANT CHANGE UP (ref 10.3–14.5)
RSV RNA NPH QL NAA+NON-PROBE: SIGNIFICANT CHANGE UP
SARS-COV-2 RNA SPEC QL NAA+PROBE: SIGNIFICANT CHANGE UP
SODIUM SERPL-SCNC: 138 MMOL/L — SIGNIFICANT CHANGE UP (ref 135–145)
SOURCE RESPIRATORY: SIGNIFICANT CHANGE UP
WBC # BLD: 5.68 K/UL — SIGNIFICANT CHANGE UP (ref 3.8–10.5)
WBC # FLD AUTO: 5.68 K/UL — SIGNIFICANT CHANGE UP (ref 3.8–10.5)

## 2025-03-24 PROCEDURE — 74177 CT ABD & PELVIS W/CONTRAST: CPT | Mod: 26

## 2025-03-24 PROCEDURE — 99285 EMERGENCY DEPT VISIT HI MDM: CPT

## 2025-03-24 PROCEDURE — 71046 X-RAY EXAM CHEST 2 VIEWS: CPT | Mod: 26

## 2025-03-24 RX ORDER — IBUPROFEN 200 MG
600 TABLET ORAL ONCE
Refills: 0 | Status: COMPLETED | OUTPATIENT
Start: 2025-03-24 | End: 2025-03-24

## 2025-03-24 RX ORDER — ACETAMINOPHEN 500 MG/5ML
975 LIQUID (ML) ORAL ONCE
Refills: 0 | Status: COMPLETED | OUTPATIENT
Start: 2025-03-24 | End: 2025-03-24

## 2025-03-24 RX ORDER — METOCLOPRAMIDE HCL 10 MG
10 TABLET ORAL ONCE
Refills: 0 | Status: COMPLETED | OUTPATIENT
Start: 2025-03-24 | End: 2025-03-24

## 2025-03-24 RX ORDER — ONDANSETRON HCL/PF 4 MG/2 ML
4 VIAL (ML) INJECTION ONCE
Refills: 0 | Status: COMPLETED | OUTPATIENT
Start: 2025-03-24 | End: 2025-03-24

## 2025-03-24 RX ADMIN — Medication 20 MILLIGRAM(S): at 21:31

## 2025-03-24 RX ADMIN — Medication 10 MILLIGRAM(S): at 23:24

## 2025-03-24 RX ADMIN — Medication 975 MILLIGRAM(S): at 21:32

## 2025-03-24 RX ADMIN — Medication 1000 MILLILITER(S): at 21:31

## 2025-03-24 RX ADMIN — Medication 975 MILLIGRAM(S): at 22:31

## 2025-03-24 RX ADMIN — Medication 4 MILLIGRAM(S): at 21:31

## 2025-03-24 RX ADMIN — Medication 10 MILLIGRAM(S): at 23:59

## 2025-03-24 NOTE — ED ADULT NURSE NOTE - OBJECTIVE STATEMENT
59 y F (denies PMH) presented to ED for abomdinal pain. Pt ednorses diarrhea starting yesterday, following nausea and vomitting. Pt endorses decreased PO intake. Pt denies chest pain, SOB, headahces,, back pain, blood in emsis or stool, changed in vision, urinary symptoms. Pt alert & oriented x3  . able to follow commands, speech clear. Breathing spontaneous and nonlabored. Abdomen soft & nondistended, complains of pain on palpation. Pt spontaneously moving all extremities, strength 5/5 x 4 extremities. Sensory intact. . Skin warm, dry & intact. Pt in gown, stretcher locked and in lowest position . Call bell within reach and instructed on how to use.

## 2025-03-24 NOTE — ED PROVIDER NOTE - RAPID ASSESSMENT
60 y/o female PMHx GERD PSx hysterectomy, cholecystectomy now presenting to the ED with N/V/D, subjective fevers and LLQ pain x2-3 days. Denied sick contacts    VALENTÍN Yeager: Patient seen by myself in triage area for rapid assessment only. Full H&P to be performed in main emergency room by ER providers. 58 y/o female PMHx GERD PSx hysterectomy, cholecystectomy now presenting to the ED with N/V/D, subjective fevers and LLQ pain x2-3 days. Denied sick contacts    VALENTÍN Yeager: Patient seen by myself in triage area for rapid assessment only. Full H&P to be performed in main emergency room by ER providers.    Attending MD Byrd: This patient was seen and orders were placed by the PA as per our department's QPA model.  I was not consulted in regards to this patient although I was present and available in the Emergency Department to the PA.  Patient was to be sent to main ED for full medical evaluation and receiving team was to follow up on any labs, analgesia, clinical imaging ordered by the PA.  Any reassessment and disposition decisions were to be made by receiving team as clinically indicated, all decisions regarding the progression of care to be made at their discretion.  I did not perform a comprehensive history and physical on this patient.

## 2025-03-24 NOTE — ED PROVIDER NOTE - PROGRESS NOTE DETAILS
Beverly, PGY3: Patient signed out to me by day team.    58 yo F w/ PMHx of GERD on Protonix, hysterectomy, cholecystectomy, and kidney stones presents for 1 to 2 days of diarrhea, nausea, abdominal pain, and decreased p.o. intake.  Patient reports no onset events, but started to have a ~15 episodes of nonbloody/nonbilious diarrhea yesterday.  At midnight (overnight), patient took Imodium and had only 5 episodes of diarrhea today.  She reports the diarrhea now is clear and she has not been able tolerate p.o. intake as result of this.  She is also having LLQ abdominal pain that is distinct from patient's previous GERD-like symptoms.  She denies any recent travel, positions, antibiotics use, hematochezia, hematuria, fevers/chills/cough/sore throat, dysuria.  She denies any acetaminophen use, smoking, or alcohol use.  She denies any recent rashes or insect bites.    Labs remarkable for AST//403 (new LFT elevations).  Serum acetaminophen level sent (22) and acute otitis panel sent.  After extensive discussion with patient/son, patient wanted to try eating to go home with Zofran and outpatient GI follow-up.    However, after multiple attempts, patient is unable to tolerate p.o. intake secondary to persistent abdominal pain.  Will give patient Reglan/morphine to address both abdominal pain and nauseousness.  Plan for admission with GI inpatient evaluation.      Symptoms most consistent with gastroenteritis with LFT elevations.  Low concern for C. difficile considering no recent hospitalization or antibiotic use.  No utility for right upper quadrant ultrasound as patient has a history of cholecystectomy. Beverly, PGY3:  Labs remarkable for AST//403 (new LFT elevations).  Serum acetaminophen level sent (22) and acute otitis panel sent.  After extensive discussion with patient/son, patient wanted to try eating to go home with Zofran and outpatient GI follow-up.    However, after multiple attempts, patient is unable to tolerate p.o. intake secondary to persistent abdominal pain.  Will give patient Reglan/morphine to address both abdominal pain and nauseousness.  Plan for admission with GI inpatient evaluation.      Symptoms most consistent with gastroenteritis with LFT elevations.  Low concern for C. difficile considering no recent hospitalization or antibiotic use.  No utility for right upper quadrant ultrasound as patient has a history of cholecystectomy. Beverly PGY3: GI emailed.  Patient borderline hypotensive.  Will give patient Bentyl and fluids before given morphine.  Admitted to medicine. Beverly PGY3: Unable to tolerate po. GI emailed.  Patient borderline hypotensive.  Will give patient Bentyl and fluids before given morphine.  Admitted to medicine.

## 2025-03-24 NOTE — ED ADULT NURSE NOTE - NSFALLUNIVINTERV_ED_ALL_ED
Bed/Stretcher in lowest position, wheels locked, appropriate side rails in place/Call bell, personal items and telephone in reach/Instruct patient to call for assistance before getting out of bed/chair/stretcher/Non-slip footwear applied when patient is off stretcher/[place] to call system/Physically safe environment - no spills, clutter or unnecessary equipment/Purposeful proactive rounding/Room/bathroom lighting operational, light cord in reach

## 2025-03-24 NOTE — ED ADULT TRIAGE NOTE - HISTORY OF COVID-19 VACCINATION
Labor Progress Note  Patient seen, fetal heart rate and contraction pattern evaluated at 1138    Physical Exam:  Cervical Exam was examined   4 cm dilated    60% effaced    -2 station    Presenting Part: cephalic  Cervical Position: posterior  Consistency: Medium    Membranes:  Intact  Uterine Activity[de-identified] Frequency: Every 3-5 minutes  Fetal Heart Rate: Reactive    Assessment/Plan:    Reassuring fetal status, Continue plan for vaginal delivery, AROM, clear fluid, FSE and IUPC placed. Reassuring fetal status. Continue current managent.   Daniel Castillo MD  4/23/2022  12:03 PM Vaccine status unknown

## 2025-03-24 NOTE — ED PROVIDER NOTE - CLINICAL SUMMARY MEDICAL DECISION MAKING FREE TEXT BOX
58 yo F w/ PMHx of GERD on Protonix, hysterectomy, cholecystectomy, and kidney stones presents for 1 to 2 days of diarrhea, nausea, abdominal pain, and decreased p.o. intake.  Patient reports no onset events, but started to have a ~15 episodes of nonbloody/nonbilious diarrhea yesterday.  At midnight (overnight), patient took Imodium and had only 5 episodes of diarrhea today.  She reports the diarrhea now is clear and she has not been able tolerate p.o. intake as result of this.  She is also having LLQ abdominal pain that is distinct from patient's previous GERD-like symptoms.  She denies any recent travel, positions, antibiotics use, hematochezia, hematuria, fevers/chills/cough/sore throat, dysuria.  She denies any acetaminophen use, smoking, or alcohol use.  She denies any recent rashes or insect bites.    PHYSICAL EXAM:   GENERAL: mild acute distress, endomorphic body habitus  HEENT: atraumatic, normocephalic, vision grossly intact, EOMI, no conjunctivitis or discharge, hearing grossly intact, no nasal discharge or epistaxis, clear pharynx  CV: regular rate, normal rhythm, normal S1/S2, no murmurs/rubs, no cyanosis  PULM: normal work of breathing, normal O2 saturation on RA, clear breath sounds in b/l upper/lower lung fields, no crackles/rales/rhonchi/wheezing  GI: LLQ abdominal TTP, soft/nondistended abdomen, no guarding or rebound tenderness, no palpable masses  NEURO: A&Ox4, follows commands, normal speech, no focal motor or sensory deficits  MSK: no joint tenderness/swelling/erythema, ranging all extremities with no appreciable loss of ROM  EXT: no peripheral edema, no calf tenderness, no redness or swelling  SKIN: warm, dry, and intact, no rashes    Vital signs unremarkable.  Concern for gastroenteritis. Lower concern for hepatitis versus acetaminophen toxicity versus tickborne illness. 58 yo F w/ PMHx of GERD on Protonix, hysterectomy, cholecystectomy, and kidney stones presents for 1 to 2 days of diarrhea, nausea, abdominal pain, and decreased p.o. intake.  Patient reports no onset events, but started to have a ~15 episodes of nonbloody/nonbilious diarrhea yesterday.  At midnight (overnight), patient took Imodium and had only 5 episodes of diarrhea today.  She reports the diarrhea now is clear and she has not been able tolerate p.o. intake as result of this.  She is also having LLQ abdominal pain that is distinct from patient's previous GERD-like symptoms.  She denies any recent travel, positions, antibiotics use, hematochezia, hematuria, fevers/chills/cough/sore throat, dysuria.  She denies any acetaminophen use, smoking, or alcohol use.  She denies any recent rashes or insect bites.    PHYSICAL EXAM:   GENERAL: mild acute distress, endomorphic body habitus  HEENT: atraumatic, normocephalic, vision grossly intact, EOMI, no conjunctivitis or discharge, hearing grossly intact, no nasal discharge or epistaxis, clear pharynx  CV: regular rate, normal rhythm, normal S1/S2, no murmurs/rubs, no cyanosis  PULM: normal work of breathing, normal O2 saturation on RA, clear breath sounds in b/l upper/lower lung fields, no crackles/rales/rhonchi/wheezing  GI: LLQ abdominal TTP, soft/nondistended abdomen, no guarding or rebound tenderness, no palpable masses  NEURO: A&Ox4, follows commands, normal speech, no focal motor or sensory deficits  MSK: no joint tenderness/swelling/erythema, ranging all extremities with no appreciable loss of ROM  EXT: no peripheral edema, no calf tenderness, no redness or swelling  SKIN: warm, dry, and intact, no rashes    Vital signs unremarkable.  Concern for gastroenteritis. Lower concern for hepatitis versus acetaminophen toxicity versus tickborne illness.    Dr. Hernandez (Attending Physician)

## 2025-03-25 DIAGNOSIS — R74.01 ELEVATION OF LEVELS OF LIVER TRANSAMINASE LEVELS: ICD-10-CM

## 2025-03-25 DIAGNOSIS — R19.7 DIARRHEA, UNSPECIFIED: ICD-10-CM

## 2025-03-25 DIAGNOSIS — K21.9 GASTRO-ESOPHAGEAL REFLUX DISEASE WITHOUT ESOPHAGITIS: ICD-10-CM

## 2025-03-25 PROBLEM — N20.0 CALCULUS OF KIDNEY: Chronic | Status: ACTIVE | Noted: 2024-01-05

## 2025-03-25 LAB
EBV EA AB SER IA-ACNC: 53.6 U/ML — HIGH
EBV EA AB TITR SER IF: POSITIVE
EBV EA IGG SER-ACNC: POSITIVE
EBV NA IGG SER IA-ACNC: 74.1 U/ML — HIGH
EBV PATRN SPEC IB-IMP: SIGNIFICANT CHANGE UP
EBV VCA IGG AVIDITY SER QL IA: POSITIVE
EBV VCA IGM SER IA-ACNC: 11.2 U/ML — SIGNIFICANT CHANGE UP
EBV VCA IGM SER IA-ACNC: >750 U/ML — HIGH
EBV VCA IGM TITR FLD: NEGATIVE — SIGNIFICANT CHANGE UP
HAV IGM SER-ACNC: SIGNIFICANT CHANGE UP
HBV CORE IGM SER-ACNC: SIGNIFICANT CHANGE UP
HBV SURFACE AG SER-ACNC: SIGNIFICANT CHANGE UP
HCV AB S/CO SERPL IA: 0.07 S/CO — SIGNIFICANT CHANGE UP (ref 0–0.79)
HCV AB SERPL-IMP: SIGNIFICANT CHANGE UP

## 2025-03-25 PROCEDURE — 99223 1ST HOSP IP/OBS HIGH 75: CPT

## 2025-03-25 RX ORDER — MAGNESIUM, ALUMINUM HYDROXIDE 200-200 MG
30 TABLET,CHEWABLE ORAL EVERY 4 HOURS
Refills: 0 | Status: DISCONTINUED | OUTPATIENT
Start: 2025-03-25 | End: 2025-03-26

## 2025-03-25 RX ORDER — ACETAMINOPHEN 500 MG/5ML
650 LIQUID (ML) ORAL EVERY 6 HOURS
Refills: 0 | Status: DISCONTINUED | OUTPATIENT
Start: 2025-03-25 | End: 2025-03-26

## 2025-03-25 RX ORDER — SODIUM CHLORIDE 9 G/1000ML
1000 INJECTION, SOLUTION INTRAVENOUS
Refills: 0 | Status: DISCONTINUED | OUTPATIENT
Start: 2025-03-25 | End: 2025-03-26

## 2025-03-25 RX ORDER — MELATONIN 5 MG
3 TABLET ORAL AT BEDTIME
Refills: 0 | Status: DISCONTINUED | OUTPATIENT
Start: 2025-03-25 | End: 2025-03-26

## 2025-03-25 RX ORDER — ONDANSETRON HCL/PF 4 MG/2 ML
4 VIAL (ML) INJECTION EVERY 8 HOURS
Refills: 0 | Status: DISCONTINUED | OUTPATIENT
Start: 2025-03-25 | End: 2025-03-26

## 2025-03-25 RX ADMIN — Medication 650 MILLIGRAM(S): at 19:00

## 2025-03-25 RX ADMIN — Medication 650 MILLIGRAM(S): at 09:41

## 2025-03-25 RX ADMIN — Medication 40 MILLIGRAM(S): at 17:46

## 2025-03-25 RX ADMIN — Medication 1000 MILLILITER(S): at 00:26

## 2025-03-25 RX ADMIN — Medication 4 MILLIGRAM(S): at 18:01

## 2025-03-25 RX ADMIN — Medication 650 MILLIGRAM(S): at 08:40

## 2025-03-25 RX ADMIN — Medication 75 MILLILITER(S): at 08:32

## 2025-03-25 RX ADMIN — SODIUM CHLORIDE 75 MILLILITER(S): 9 INJECTION, SOLUTION INTRAVENOUS at 14:56

## 2025-03-25 RX ADMIN — Medication 650 MILLIGRAM(S): at 18:00

## 2025-03-25 NOTE — H&P ADULT - ASSESSMENT
58 yo F with hx of  s/p cholecystectomy, GERD p/w 3-4 days of diarrhea, nausea and vomiting admitted for likely gastroenteritis viral v bacterial and transaminitis

## 2025-03-25 NOTE — H&P ADULT - PROBLEM SELECTOR PLAN 1
viral v bacterial gastroenteritis  check GI PCR  if stool consistency permits can send for c diff pcr although my suspicion is quite low, monitor stool counts inpatient  CLD  zofran, PPI   CT abd pelvis with hepatic steatosis, otherwise unremarkable  f/u acute hepatitis panel  IVF

## 2025-03-25 NOTE — PATIENT PROFILE ADULT - LEGAL HELP
Patient mobility status  with no difficulty. Provider aware     I have reviewed discharge instructions with the patient.  The patient verbalized understanding.    Patient left ED via Discharge Method: ambulatory to Home with mother.    Opportunity for questions and clarification provided.     Patient given 1 scripts.         
no

## 2025-03-25 NOTE — H&P ADULT - NSHPPHYSICALEXAM_GEN_ALL_CORE
Vital Signs Last 24 Hrs  T(C): 36.5 (25 Mar 2025 05:32), Max: 37.9 (24 Mar 2025 21:40)  T(F): 97.7 (25 Mar 2025 05:32), Max: 100.3 (24 Mar 2025 21:40)  HR: 69 (25 Mar 2025 05:32) (67 - 115)  BP: 93/58 (25 Mar 2025 05:32) (91/59 - 122/84)  BP(mean): 70 (25 Mar 2025 01:30) (70 - 77)  RR: 18 (25 Mar 2025 05:32) (16 - 18)  SpO2: 95% (25 Mar 2025 05:32) (93% - 100%)    Parameters below as of 25 Mar 2025 05:32  Patient On (Oxygen Delivery Method): room air        CONSTITUTIONAL: NAD, well-developed, well-groomed  EYES: PERRL; conjunctiva and sclera clear  ENMT: Moist oral mucosa, no pharyngeal injection or exudates; normal dentition  NECK: Supple, no palpable masses;   RESPIRATORY: Normal respiratory effort; lungs are clear to auscultation bilaterally  CARDIOVASCULAR: Regular rate and rhythm, normal S1 and S2, no murmur/rub/gallop; No lower extremity edema; Peripheral pulses are 2+ bilaterally  ABDOMEN: mild TTP in LLQ,  normoactive bowel sounds, no rebound/guarding;   MUSCULOSKELETAL:   no clubbing or cyanosis of digits; no joint swelling or tenderness to palpation  PSYCH: A+O to person, place, and time; affect appropriate   SKIN: No rashes; no palpable lesions

## 2025-03-25 NOTE — H&P ADULT - PROBLEM SELECTOR PLAN 2
low hemoglobin/abnormal lab result
could be 2/2 to gastroenteritis and or tylenol use- monitor Acetominophen level  GI consulted in the ED, f./u further recs

## 2025-03-25 NOTE — CONSULT NOTE ADULT - ASSESSMENT
59F PMH GERD PSH hysterectomy, cholecystectomy, and appendectomy presenting with nonbloody watery diarrhea, nausea, and abdominal pain starting on 3/23. Physical exam positive for diffuse abdominal pain most severe in LLQ. Labs show elevated liver enzymes (  AlkP 139), bilirubin wnl. CTAP showed hepatic steatosis. Patient's presentation is most consistent with gastroenteritis (viral vs. bacterial)    #Diarrhea  Patient has had 15 episodes on Sunday, 5 episodes on Monday after taking Imodium Sunday night, and 3 episodes this morning after eating (currently on CLD).     #Transaminitis  Labs show elevated liver enzymes (  Alk P 139) bilirubin wnl. Patient does not have family history of liver disease or history of alcohol or drug use.     Recommendations:  - GI PCR  - Hepatitis workup  - Antiemetics as needed  - IV fluids as needed  - Advance diet as tolerated      59F PMH GERD PSH hysterectomy, cholecystectomy, and appendectomy presenting with nonbloody watery diarrhea, nausea, and abdominal pain starting on 3/23. Physical exam positive for diffuse abdominal pain most severe in LLQ. Labs show elevated liver enzymes (  AlkP 139), bilirubin wnl. CTAP showed hepatic steatosis. Patient's presentation is most consistent with gastroenteritis (viral vs. bacterial)    #Diarrhea  Patient has had 15 episodes on Sunday, 5 episodes on Monday after taking Imodium Sunday night, and 3 episodes this morning after eating (currently on CLD).     #Transaminitis  Labs show elevated liver enzymes (  Alk P 139) bilirubin wnl. Acetaminophen level wnl. Patient does not have family history of liver disease or history of alcohol or drug use.     Recommendations:  - GI PCR  - Hepatitis workup  - Antiemetics as needed  - IV fluids as needed  - Advance diet as tolerated      59F PMH GERD PSH hysterectomy, cholecystectomy, and appendectomy presenting with nonbloody watery diarrhea, nausea, and abdominal pain starting on 3/23. Physical exam positive for diffuse abdominal pain most severe in LLQ. Labs show elevated liver enzymes (  AlkP 139), bilirubin wnl. CTAP showed hepatic steatosis. Patient's presentation is most consistent with acute gastroenteritis (viral vs. bacterial) vs IBD vs microcolitis    #Diarrhea  Patient has had 15 episodes on Sunday, 5 episodes on Monday after taking Imodium Sunday night, and 3 episodes this morning after eating (currently on CLD).     #Transaminitis  Labs show elevated liver enzymes (  Alk P 139) bilirubin wnl. Acetaminophen level wnl. Patient does not have family history of liver disease or history of alcohol or drug use.     Recommendations:  - GI PCR, C diff  - If negative and diarrhea persists can consider colonoscopy  - Viral hepatitis workup  - Autoimmune hepatitis workup  - Antiemetics as needed  - IV fluids   - CLD in case patient requires colonscopy      59F PMH GERD PSH hysterectomy, cholecystectomy, and appendectomy presenting with nonbloody watery diarrhea, nausea, and abdominal pain starting on 3/23. Physical exam positive for diffuse abdominal pain most severe in LLQ. Labs show elevated liver enzymes (  AlkP 139), bilirubin wnl. CTAP showed hepatic steatosis. Patient's presentation is most consistent with acute gastroenteritis (viral vs. bacterial) vs IBD vs microcolitis    #Diarrhea  Patient has had 15 episodes on Sunday, 5 episodes on Monday after taking Imodium Sunday night, and 3 episodes this morning after eating (currently on CLD).     #Transaminitis  Labs show elevated liver enzymes (  Alk P 139) bilirubin wnl. Acetaminophen level wnl. Patient does not have family history of liver disease or history of alcohol or drug use. Could be secondary to diarrhea vs viral hepatitis vs autoimmune hepatitis    Recommendations:  - GI PCR, C diff  - If negative and diarrhea persists can consider colonoscopy  - Viral hepatitis workup  - Autoimmune hepatitis workup  - Trend liver enzymes  - Antiemetics as needed  - IV fluids   - CLD in case patient requires colonscopy

## 2025-03-25 NOTE — H&P ADULT - HISTORY OF PRESENT ILLNESS
58 yo F with hx of  s/p cholecystectomy, GERD p/w 3-4 days of diarrhea, nausea and vomiting. Patient states she was in her usual state of health when she began to have crampy abdominal pain with loose stools 3-4 times a day. Denies any recent antibiotics raw foods. states she only really had chicken and rice day symptoms started which is not unusual for her. She went to urgent care and had some labs drawn and her LFTs were high so they sent her to ED. She had been taking a lot of tylenol for the pain. Went to [place] in January but no symptoms then. she denies any fevers, chills, bloody stools, joint pain or rashes. this has never happened before.

## 2025-03-25 NOTE — CONSULT NOTE ADULT - ATTENDING COMMENTS
58 yo F pmh GERD on PPI, h/o ottoniel, h/o appy presenting with acute N/V/Diarrhea over past 48 hours. No other sick contacts or affected family members.  Non bloody, diarrhea up to 15+ BM per day, even 6-8 with imodium. On labs - incidentally found to have elevated LAEs (AST/ALT/Alk P) but normal liver function.  No history of elevated liver enzymes or GI/derm/rheum symptoms at home.    Imp:  Diarrhea - suspect infections.  ddx also includes IBD, Microscopic colitis  Elevated LAEs - suspect 2/2 ischemic injury in setting of dehydration.  ddx also includes DILI, AIH, Viral Hep    Plan  Get Stool PCR, C Diff  Agree with viral hep panel, AIH labs  Trend LAEs, INR  Supportive Care  Clear liquid diet (in case needs procedure thursday/friday)    GI to follow

## 2025-03-25 NOTE — CONSULT NOTE ADULT - SUBJECTIVE AND OBJECTIVE BOX
HPI: 59F PMH GERD PSH hysterectomy, cholecystectomy, and appendectomy presenting with diarrhea, nausea, and abdominal pain starting on 3/23. The pt states that 2 days ago she had 15 episodes of nonbloody diarrhea. She then took Imodium and the next day had 5 episodes of nonbloody diarrhea. Today she reports 3 episodes of nonbloody diarrhea associated with eating. She describes the diarrhea as watery, brown and clear and reports no changes since her symptoms began. She reports subjective fevers prior to coming to the ED but has been afebrile. She has crampy abdominal pain diffusely but reports that the pain is worse in the LLQ. She denies vomiting, dysuria, shortness of breath, chest pain. She reports that her urine has seemed "brown" for the past two weeks but no changes in frequency. She traveled to [place] in January and reports no bug bites or rashes. She denies sick contacts. Denies recent antibiotics. She had a colonscopy 3 years ago which was normal. Liver enzymes were elevated (  AlkP 139) with normal bilirubin. Per her PCP, she received blood work outpatient on 3/16/25 and liver enzymes were normal then.    Allergies:  No Known Allergies      Home Medications: Protonix 40 mg    Hospital Medications:  acetaminophen     Tablet .. 650 milliGRAM(s) Oral every 6 hours PRN  aluminum hydroxide/magnesium hydroxide/simethicone Suspension 30 milliLiter(s) Oral every 4 hours PRN  melatonin 3 milliGRAM(s) Oral at bedtime PRN  ondansetron Injectable 4 milliGRAM(s) IV Push every 8 hours PRN  pantoprazole  Injectable 40 milliGRAM(s) IV Push every 12 hours  sodium chloride 0.9%. 1000 milliLiter(s) IV Continuous <Continuous>      PMHX/PSHX:  GERD, Hysterectomy, Cholecystectomy, Appendectomy    Nephrolithiasis    Family history:      Father: colon cancer, diabetes  Mother: HTN, HLD  No history of liver disease in the family    Social History:   Tob: Denies  EtOH: Denies  Illicit Drugs: Denies    ROS:     General:  No wt loss, +fevers, chills, night sweats, fatigue  Eyes:  Good vision, no reported pain  ENT:  No sore throat, pain, runny nose, dysphagia  CV:  No pain, palpitations, hypo/hypertension  Pulm:  No dyspnea, cough, tachypnea, wheezing  GI:  see HPI  :  No pain, bleeding, incontinence, nocturia  Muscle:  No pain, weakness  Neuro:  No weakness, tingling, memory problems  Psych:  No fatigue, insomnia, mood problems, depression  Endocrine:  No polyuria, polydipsia, cold/heat intolerance  Heme:  No petechiae, ecchymosis, easy bruisability  Skin:  No rash, tattoos, scars, edema    PHYSICAL EXAM:     GENERAL:  No acute distress  HEENT:  NCAT, no scleral icterus   CHEST:  no respiratory distress  HEART:  Regular rate and rhythm  ABDOMEN:  Soft, non-tender, non-distended, normoactive bowel sounds,  no masses, +abdominal pain on palpation in all 4 quadrants, LLQ most severe  EXTREMITIES: No edema  SKIN:  No rash/erythema/ecchymoses/petechiae/wounds/abscess  NEURO:  Alert and oriented x 3, no asterixis    Vital Signs:  Vital Signs Last 24 Hrs  T(C): 36.4 (25 Mar 2025 11:32), Max: 37.9 (24 Mar 2025 21:40)  T(F): 97.6 (25 Mar 2025 11:32), Max: 100.3 (24 Mar 2025 21:40)  HR: 68 (25 Mar 2025 11:32) (67 - 115)  BP: 104/65 (25 Mar 2025 11:32) (91/59 - 122/84)  BP(mean): 70 (25 Mar 2025 01:30) (70 - 77)  RR: 18 (25 Mar 2025 11:32) (16 - 18)  SpO2: 94% (25 Mar 2025 11:32) (93% - 100%)    Parameters below as of 25 Mar 2025 11:32  Patient On (Oxygen Delivery Method): room air      Daily Height in cm: 152 (24 Mar 2025 17:42)    Daily     LABS:                        14.9   5.68  )-----------( 254      ( 24 Mar 2025 20:06 )             44.1     Mean Cell Volume: 87.5 fl (03-24-25 @ 20:06)    03-24    138  |  102  |  11  ----------------------------<  97  3.8   |  20[L]  |  0.75    Ca    9.2      24 Mar 2025 20:06    TPro  7.6  /  Alb  4.3  /  TBili  1.0  /  DBili  x   /  AST  508[H]  /  ALT  403[H]  /  AlkPhos  139[H]  03-24    LIVER FUNCTIONS - ( 24 Mar 2025 20:06 )  Alb: 4.3 g/dL / Pro: 7.6 g/dL / ALK PHOS: 139 U/L / ALT: 403 U/L / AST: 508 U/L / GGT: x           PT/INR - ( 24 Mar 2025 20:06 )   PT: 11.9 sec;   INR: 1.04 ratio         PTT - ( 24 Mar 2025 20:06 )  PTT:31.3 sec  Urinalysis Basic - ( 24 Mar 2025 20:06 )    Color: x / Appearance: x / SG: x / pH: x  Gluc: 97 mg/dL / Ketone: x  / Bili: x / Urobili: x   Blood: x / Protein: x / Nitrite: x   Leuk Esterase: x / RBC: x / WBC x   Sq Epi: x / Non Sq Epi: x / Bacteria: x                              14.9   5.68  )-----------( 254      ( 24 Mar 2025 20:06 )             44.1       Imaging: No acute findings. Hepatic steatosis.               HPI: 59F PMH GERD PSH hysterectomy, cholecystectomy, and appendectomy presenting with diarrhea, nausea, and abdominal pain starting on 3/23. The pt states that 2 days ago she had 15 episodes of nonbloody diarrhea. She then took Imodium and the next day had 5 episodes of nonbloody diarrhea. Today she reports 3 episodes of nonbloody diarrhea associated with eating (on CLD). She describes the diarrhea as watery, brown and clear and reports no changes since her symptoms began. She reports subjective fevers prior to coming to the ED but has been afebrile. She has crampy abdominal pain diffusely but reports that the pain is worse in the LLQ. She denies vomiting, dysuria, shortness of breath, chest pain. She reports that her urine has seemed "brown" for the past two weeks but no changes in frequency or pain. She traveled to [place] in January and reports no bug bites or rashes. She denies sick contacts. Denies recent antibiotics. She had a colonscopy 3 years ago which was normal. Liver enzymes were elevated (  AlkP 139) with normal bilirubin. Per her PCP, she received blood work outpatient on 3/16/25 and liver enzymes were normal then.    Allergies:  No Known Allergies      Home Medications: Protonix 40 mg    Hospital Medications:  acetaminophen     Tablet .. 650 milliGRAM(s) Oral every 6 hours PRN  aluminum hydroxide/magnesium hydroxide/simethicone Suspension 30 milliLiter(s) Oral every 4 hours PRN  melatonin 3 milliGRAM(s) Oral at bedtime PRN  ondansetron Injectable 4 milliGRAM(s) IV Push every 8 hours PRN  pantoprazole  Injectable 40 milliGRAM(s) IV Push every 12 hours  sodium chloride 0.9%. 1000 milliLiter(s) IV Continuous <Continuous>      PMHX/PSHX:  GERD, Hysterectomy, Cholecystectomy, Appendectomy    Nephrolithiasis    Family history:      Father: colon cancer, diabetes  Mother: HTN, HLD  No history of liver disease in the family    Social History:   Tob: Denies  EtOH: Denies  Illicit Drugs: Denies    ROS:     General:  No wt loss, +fevers, chills, night sweats, fatigue  Eyes:  Good vision, no reported pain  ENT:  No sore throat, pain, runny nose, dysphagia  CV:  No pain, palpitations, hypo/hypertension  Pulm:  No dyspnea, cough, tachypnea, wheezing  GI:  see HPI  :  No pain, bleeding, incontinence, nocturia  Muscle:  No pain, weakness  Neuro:  No weakness, tingling, memory problems  Psych:  No fatigue, insomnia, mood problems, depression  Endocrine:  No polyuria, polydipsia, cold/heat intolerance  Heme:  No petechiae, ecchymosis, easy bruisability  Skin:  No rash, tattoos, scars, edema    PHYSICAL EXAM:     GENERAL:  No acute distress  HEENT:  NCAT, no scleral icterus   CHEST:  no respiratory distress  HEART:  Regular rate and rhythm  ABDOMEN:  Soft, non-tender, non-distended, normoactive bowel sounds,  no masses, +abdominal pain on palpation in all 4 quadrants, LLQ most severe  EXTREMITIES: No edema  SKIN:  No rash/erythema/ecchymoses/petechiae/wounds/abscess  NEURO:  Alert and oriented x 3, no asterixis    Vital Signs:  Vital Signs Last 24 Hrs  T(C): 36.4 (25 Mar 2025 11:32), Max: 37.9 (24 Mar 2025 21:40)  T(F): 97.6 (25 Mar 2025 11:32), Max: 100.3 (24 Mar 2025 21:40)  HR: 68 (25 Mar 2025 11:32) (67 - 115)  BP: 104/65 (25 Mar 2025 11:32) (91/59 - 122/84)  BP(mean): 70 (25 Mar 2025 01:30) (70 - 77)  RR: 18 (25 Mar 2025 11:32) (16 - 18)  SpO2: 94% (25 Mar 2025 11:32) (93% - 100%)    Parameters below as of 25 Mar 2025 11:32  Patient On (Oxygen Delivery Method): room air      Daily Height in cm: 152 (24 Mar 2025 17:42)    Daily     LABS:                        14.9   5.68  )-----------( 254      ( 24 Mar 2025 20:06 )             44.1     Mean Cell Volume: 87.5 fl (03-24-25 @ 20:06)    03-24    138  |  102  |  11  ----------------------------<  97  3.8   |  20[L]  |  0.75    Ca    9.2      24 Mar 2025 20:06    TPro  7.6  /  Alb  4.3  /  TBili  1.0  /  DBili  x   /  AST  508[H]  /  ALT  403[H]  /  AlkPhos  139[H]  03-24    LIVER FUNCTIONS - ( 24 Mar 2025 20:06 )  Alb: 4.3 g/dL / Pro: 7.6 g/dL / ALK PHOS: 139 U/L / ALT: 403 U/L / AST: 508 U/L / GGT: x           PT/INR - ( 24 Mar 2025 20:06 )   PT: 11.9 sec;   INR: 1.04 ratio         PTT - ( 24 Mar 2025 20:06 )  PTT:31.3 sec  Urinalysis Basic - ( 24 Mar 2025 20:06 )    Color: x / Appearance: x / SG: x / pH: x  Gluc: 97 mg/dL / Ketone: x  / Bili: x / Urobili: x   Blood: x / Protein: x / Nitrite: x   Leuk Esterase: x / RBC: x / WBC x   Sq Epi: x / Non Sq Epi: x / Bacteria: x                              14.9   5.68  )-----------( 254      ( 24 Mar 2025 20:06 )             44.1       Imaging: No acute findings. Hepatic steatosis.

## 2025-03-25 NOTE — H&P ADULT - NSHPLABSRESULTS_GEN_ALL_CORE
14.9   5.68  )-----------( 254      ( 24 Mar 2025 20:06 )             44.1   03-24    138  |  102  |  11  ----------------------------<  97  3.8   |  20[L]  |  0.75    Ca    9.2      24 Mar 2025 20:06    TPro  7.6  /  Alb  4.3  /  TBili  1.0  /  DBili  x   /  AST  508[H]  /  ALT  403[H]  /  AlkPhos  139[H]  03-24      Acetaminophen Level, Serum: 22 ug/mL (03.24.25 @ 21:51)      < from: CT Abdomen and Pelvis w/ IV Cont (03.24.25 @ 20:24) >    LIVER: Hepatic steatosis.  BILE DUCTS: Within normal limits.  GALLBLADDER: Cholecystectomy.  SPLEEN: Within normal limits.  PANCREAS: Within normal limits.  ADRENALS: Within normal limits.  KIDNEYS/URETERS: Symmetric renal enhancement bilaterally. No   hydronephrosis. Few small subcentimeter renal hypodensities, too small to   characterize.    BLADDER: Evaluation limited secondary to streak artifact from right hip   arthroplasty.  REPRODUCTIVE ORGANS: Hysterectomy.    BOWEL: No bowel obstruction. Appendix is not visualized, likely status   post appendectomy.  PERITONEUM/RETROPERITONEUM: No ascites or pneumoperitoneum.  VESSELS: Within normal limits. Hepatic and portal veins and SMV are   patent. No abdominal aortic aneurysm.  LYMPH NODES: No lymphadenopathy.  ABDOMINAL WALL: Small fat-containing umbilical hernia.  BONES: Degenerative changes. Right hip arthroplasty creating streak   artifact in the pelvis.    IMPRESSION:  No acute findings.    Hepatic steatosis.    < end of copied text >

## 2025-03-26 ENCOUNTER — TRANSCRIPTION ENCOUNTER (OUTPATIENT)
Age: 60
End: 2025-03-26

## 2025-03-26 VITALS
HEART RATE: 81 BPM | TEMPERATURE: 99 F | OXYGEN SATURATION: 96 % | RESPIRATION RATE: 18 BRPM | SYSTOLIC BLOOD PRESSURE: 119 MMHG | DIASTOLIC BLOOD PRESSURE: 71 MMHG

## 2025-03-26 LAB
ALBUMIN SERPL ELPH-MCNC: 3.5 G/DL — SIGNIFICANT CHANGE UP (ref 3.3–5)
ALP SERPL-CCNC: 112 U/L — SIGNIFICANT CHANGE UP (ref 40–120)
ALT FLD-CCNC: 294 U/L — HIGH (ref 10–45)
ANION GAP SERPL CALC-SCNC: 12 MMOL/L — SIGNIFICANT CHANGE UP (ref 5–17)
AST SERPL-CCNC: 184 U/L — HIGH (ref 10–40)
BILIRUB DIRECT SERPL-MCNC: 0.2 MG/DL — SIGNIFICANT CHANGE UP (ref 0–0.3)
BILIRUB INDIRECT FLD-MCNC: 0.2 MG/DL — SIGNIFICANT CHANGE UP (ref 0.2–1)
BILIRUB SERPL-MCNC: 0.4 MG/DL — SIGNIFICANT CHANGE UP (ref 0.2–1.2)
BUN SERPL-MCNC: 6 MG/DL — LOW (ref 7–23)
C DIFF GDH STL QL: NEGATIVE — SIGNIFICANT CHANGE UP
C DIFF GDH STL QL: SIGNIFICANT CHANGE UP
CALCIUM SERPL-MCNC: 8.7 MG/DL — SIGNIFICANT CHANGE UP (ref 8.4–10.5)
CHLORIDE SERPL-SCNC: 108 MMOL/L — SIGNIFICANT CHANGE UP (ref 96–108)
CO2 SERPL-SCNC: 23 MMOL/L — SIGNIFICANT CHANGE UP (ref 22–31)
CREAT SERPL-MCNC: 0.69 MG/DL — SIGNIFICANT CHANGE UP (ref 0.5–1.3)
EGFR: 99 ML/MIN/1.73M2 — SIGNIFICANT CHANGE UP
EGFR: 99 ML/MIN/1.73M2 — SIGNIFICANT CHANGE UP
GI PCR PANEL: DETECTED
GLUCOSE SERPL-MCNC: 84 MG/DL — SIGNIFICANT CHANGE UP (ref 70–99)
HCT VFR BLD CALC: 35.8 % — SIGNIFICANT CHANGE UP (ref 34.5–45)
HGB BLD-MCNC: 12.4 G/DL — SIGNIFICANT CHANGE UP (ref 11.5–15.5)
LIDOCAIN IGE QN: 34 U/L — SIGNIFICANT CHANGE UP (ref 7–60)
MAGNESIUM SERPL-MCNC: 2.1 MG/DL — SIGNIFICANT CHANGE UP (ref 1.6–2.6)
MCHC RBC-ENTMCNC: 30.3 PG — SIGNIFICANT CHANGE UP (ref 27–34)
MCHC RBC-ENTMCNC: 34.6 G/DL — SIGNIFICANT CHANGE UP (ref 32–36)
MCV RBC AUTO: 87.5 FL — SIGNIFICANT CHANGE UP (ref 80–100)
NOROVIRUS GI+II RNA STL QL NAA+NON-PROBE: DETECTED
NRBC BLD AUTO-RTO: 0 /100 WBCS — SIGNIFICANT CHANGE UP (ref 0–0)
PLATELET # BLD AUTO: 217 K/UL — SIGNIFICANT CHANGE UP (ref 150–400)
POTASSIUM SERPL-MCNC: 3.8 MMOL/L — SIGNIFICANT CHANGE UP (ref 3.5–5.3)
POTASSIUM SERPL-SCNC: 3.8 MMOL/L — SIGNIFICANT CHANGE UP (ref 3.5–5.3)
PROT SERPL-MCNC: 5.8 G/DL — LOW (ref 6–8.3)
RBC # BLD: 4.09 M/UL — SIGNIFICANT CHANGE UP (ref 3.8–5.2)
RBC # FLD: 12.4 % — SIGNIFICANT CHANGE UP (ref 10.3–14.5)
SODIUM SERPL-SCNC: 143 MMOL/L — SIGNIFICANT CHANGE UP (ref 135–145)
WBC # BLD: 5.3 K/UL — SIGNIFICANT CHANGE UP (ref 3.8–10.5)
WBC # FLD AUTO: 5.3 K/UL — SIGNIFICANT CHANGE UP (ref 3.8–10.5)

## 2025-03-26 PROCEDURE — 80053 COMPREHEN METABOLIC PANEL: CPT

## 2025-03-26 PROCEDURE — 83735 ASSAY OF MAGNESIUM: CPT

## 2025-03-26 PROCEDURE — 36415 COLL VENOUS BLD VENIPUNCTURE: CPT

## 2025-03-26 PROCEDURE — 87637 SARSCOV2&INF A&B&RSV AMP PRB: CPT

## 2025-03-26 PROCEDURE — 80048 BASIC METABOLIC PNL TOTAL CA: CPT

## 2025-03-26 PROCEDURE — 82435 ASSAY OF BLOOD CHLORIDE: CPT

## 2025-03-26 PROCEDURE — 86850 RBC ANTIBODY SCREEN: CPT

## 2025-03-26 PROCEDURE — 86664 EPSTEIN-BARR NUCLEAR ANTIGEN: CPT

## 2025-03-26 PROCEDURE — 82803 BLOOD GASES ANY COMBINATION: CPT

## 2025-03-26 PROCEDURE — 86900 BLOOD TYPING SEROLOGIC ABO: CPT

## 2025-03-26 PROCEDURE — 99232 SBSQ HOSP IP/OBS MODERATE 35: CPT

## 2025-03-26 PROCEDURE — 85018 HEMOGLOBIN: CPT

## 2025-03-26 PROCEDURE — 83690 ASSAY OF LIPASE: CPT

## 2025-03-26 PROCEDURE — 85730 THROMBOPLASTIN TIME PARTIAL: CPT

## 2025-03-26 PROCEDURE — 85610 PROTHROMBIN TIME: CPT

## 2025-03-26 PROCEDURE — 80076 HEPATIC FUNCTION PANEL: CPT

## 2025-03-26 PROCEDURE — 80307 DRUG TEST PRSMV CHEM ANLYZR: CPT

## 2025-03-26 PROCEDURE — 80074 ACUTE HEPATITIS PANEL: CPT

## 2025-03-26 PROCEDURE — 96374 THER/PROPH/DIAG INJ IV PUSH: CPT

## 2025-03-26 PROCEDURE — 87449 NOS EACH ORGANISM AG IA: CPT

## 2025-03-26 PROCEDURE — 96375 TX/PRO/DX INJ NEW DRUG ADDON: CPT

## 2025-03-26 PROCEDURE — 84295 ASSAY OF SERUM SODIUM: CPT

## 2025-03-26 PROCEDURE — 87040 BLOOD CULTURE FOR BACTERIA: CPT

## 2025-03-26 PROCEDURE — 99239 HOSP IP/OBS DSCHRG MGMT >30: CPT

## 2025-03-26 PROCEDURE — 86665 EPSTEIN-BARR CAPSID VCA: CPT

## 2025-03-26 PROCEDURE — 85025 COMPLETE CBC W/AUTO DIFF WBC: CPT

## 2025-03-26 PROCEDURE — 86255 FLUORESCENT ANTIBODY SCREEN: CPT

## 2025-03-26 PROCEDURE — 85014 HEMATOCRIT: CPT

## 2025-03-26 PROCEDURE — 87507 IADNA-DNA/RNA PROBE TQ 12-25: CPT

## 2025-03-26 PROCEDURE — 82330 ASSAY OF CALCIUM: CPT

## 2025-03-26 PROCEDURE — 86663 EPSTEIN-BARR ANTIBODY: CPT

## 2025-03-26 PROCEDURE — 85027 COMPLETE CBC AUTOMATED: CPT

## 2025-03-26 PROCEDURE — 71046 X-RAY EXAM CHEST 2 VIEWS: CPT

## 2025-03-26 PROCEDURE — 84132 ASSAY OF SERUM POTASSIUM: CPT

## 2025-03-26 PROCEDURE — 83605 ASSAY OF LACTIC ACID: CPT

## 2025-03-26 PROCEDURE — 87324 CLOSTRIDIUM AG IA: CPT

## 2025-03-26 PROCEDURE — 99285 EMERGENCY DEPT VISIT HI MDM: CPT | Mod: 25

## 2025-03-26 PROCEDURE — 82947 ASSAY GLUCOSE BLOOD QUANT: CPT

## 2025-03-26 PROCEDURE — 74177 CT ABD & PELVIS W/CONTRAST: CPT | Mod: MC

## 2025-03-26 PROCEDURE — 86901 BLOOD TYPING SEROLOGIC RH(D): CPT

## 2025-03-26 RX ORDER — MAGNESIUM, ALUMINUM HYDROXIDE 200-200 MG
30 TABLET,CHEWABLE ORAL
Qty: 540 | Refills: 0
Start: 2025-03-26 | End: 2025-03-28

## 2025-03-26 RX ORDER — MAGNESIUM, ALUMINUM HYDROXIDE 200-200 MG
30 TABLET,CHEWABLE ORAL
Qty: 0 | Refills: 0 | DISCHARGE
Start: 2025-03-26

## 2025-03-26 RX ADMIN — Medication 40 MILLIGRAM(S): at 06:38

## 2025-03-26 RX ADMIN — Medication 650 MILLIGRAM(S): at 12:18

## 2025-03-26 RX ADMIN — Medication 650 MILLIGRAM(S): at 07:00

## 2025-03-26 RX ADMIN — SODIUM CHLORIDE 75 MILLILITER(S): 9 INJECTION, SOLUTION INTRAVENOUS at 06:37

## 2025-03-26 RX ADMIN — Medication 650 MILLIGRAM(S): at 06:38

## 2025-03-26 RX ADMIN — Medication 650 MILLIGRAM(S): at 13:07

## 2025-03-26 RX ADMIN — Medication 4 MILLIGRAM(S): at 06:38

## 2025-03-26 NOTE — DISCHARGE NOTE NURSING/CASE MANAGEMENT/SOCIAL WORK - FINANCIAL ASSISTANCE
[facility] provides services at a reduced cost to those who are determined to be eligible through [facility]’s financial assistance program. Information regarding [facility]’s financial assistance program can be found by going to [email or web address] or by calling [phone].

## 2025-03-26 NOTE — PROGRESS NOTE ADULT - ASSESSMENT
59F PMH GERD PSH hysterectomy, cholecystectomy, and appendectomy presenting with nonbloody watery diarrhea, nausea, and abdominal pain starting on 3/23. Physical exam positive for diffuse abdominal pain most severe in LLQ. GI PCR positive for norovirus. Labs show elevated liver enzymes (  AlkP 139), bilirubin wnl on 3/25, liver enzymes now downtrending. CTAP showed hepatic steatosis. Patient's presentation is most consistent with viral gastroenteritis    #Diarrhea  Patient has had 15 episodes on Sunday, 5 episodes on Monday after taking Imodium Sunday night, __ episodes yesterday and __ episodes today. Symptoms improving. GI PCR positive for norovirus    #Transaminitis  Labs show elevated liver enzymes (  Alk P 139) bilirubin wnl on 3/25. Liver enzymes now improving (  Alk P 112) Patient does not have family history of liver disease or history of alcohol or drug use. Acute hepatitis panel negative. Likely secondary to dehydration from diarrhea    Recommendations:  - Trend liver enzymes  - Antiemetics as needed  - IV fluids   - Since diarrhea seems to be resolving pt can eat as tolerated   59F PMH GERD PSH hysterectomy, cholecystectomy, and appendectomy presenting with nonbloody watery diarrhea, nausea, and abdominal pain starting on 3/23. Physical exam positive for diffuse abdominal pain most severe in LLQ. GI PCR positive for norovirus. Labs show elevated liver enzymes (  AlkP 139), bilirubin wnl on 3/25, liver enzymes now downtrending. CTAP showed hepatic steatosis. Patient's presentation is most consistent with viral gastroenteritis    #Diarrhea  Patient has had 15 episodes on Sunday, 5 episodes on Monday after taking Imodium Sunday night, 5-6 episodes yesterday and 2 episodes today. GI PCR positive for norovirus    #Transaminitis  Labs show elevated liver enzymes (  Alk P 139) bilirubin wnl on 3/25. Liver enzymes now improving (  Alk P 112) Patient does not have family history of liver disease or history of alcohol or drug use. Acute hepatitis panel negative. Likely secondary to dehydration from diarrhea. Pt also reported taking Higasan at home (herbal tea, ingredients include peumus boldus, baccaris geniselloides, equisetum bogotense, gentianelle alborosea). We discussed not taking this anymore as there have been some case reports of elevated liver enzymes, pt in agreement.    Recommendations:  - Antiemetics as needed  - IV fluids   - Since diarrhea seems to be resolving pt can eat as tolerated

## 2025-03-26 NOTE — DISCHARGE NOTE PROVIDER - HOSPITAL COURSE
60 yo F with hx of  s/p cholecystectomy, GERD p/w 3-4 days of diarrhea, nausea and vomiting admitted for likely gastroenteritis + Norovirus     Patient seen and examined at bedside. She is tolerating regular diet, stools are more formed this morning. No acute events overnight  Cdiff and acute hep panel neg and LFTs improving. Medically stable for d/c home today HPI:58 yo F with hx of  s/p cholecystectomy, GERD p/w 3-4 days of diarrhea, nausea and vomiting. Patient states she was in her usual state of health when she began to have crampy abdominal pain with loose stools 3-4 times a day. Denies any recent antibiotics raw foods. states she only really had chicken and rice day symptoms started which is not unusual for her. She went to urgent care and had some labs drawn and her LFTs were high so they sent her to ED. She had been taking a lot of tylenol for the pain. Went to [place] in January but no symptoms then. she denies any fevers, chills, bloody stools, joint pain or rashes. this has never happened before.  (25 Mar 2025 08:23)    Hospital Course: 58 yo F with hx of  s/p cholecystectomy, GERD p/w 3-4 days of diarrhea, nausea and vomiting admitted for likely gastroenteritis + Norovirus   Patient seen and examined at bedside. She is tolerating regular diet, stools are more formed this morning. No acute events overnight  Cdiff and acute hep panel neg and LFTs       Important Medication Changes and Reason: Maalox prn added     Active or Pending Issues Requiring Follow-up: PCP     Advanced Directives:   [x] Full code  [ ] DNR  [ ] Hospice    Discharge Diagnoses:  gastroenteritis + Norovirus  Elevated LFTs            HPI:58 yo F with hx of  s/p cholecystectomy, GERD p/w 3-4 days of diarrhea, nausea and vomiting. Patient states she was in her usual state of health when she began to have crampy abdominal pain with loose stools 3-4 times a day. Denies any recent antibiotics raw foods. states she only really had chicken and rice day symptoms started which is not unusual for her. She went to urgent care and had some labs drawn and her LFTs were high so they sent her to ED. She had been taking a lot of tylenol for the pain. Went to [place] in January but no symptoms then. she denies any fevers, chills, bloody stools, joint pain or rashes. this has never happened before.  (25 Mar 2025 08:23)    Hospital Course: 58 yo F with hx of  s/p cholecystectomy, GERD p/w 3-4 days of diarrhea, nausea and vomiting admitted for likely gastroenteritis + Norovirus   Patient seen and examined at bedside. She is tolerating regular diet, stools are more formed this morning. No acute events overnight  Cdiff and acute hep panel neg and LFTs.       Important Medication Changes and Reason: Maalox prn added     Active or Pending Issues Requiring Follow-up: PCP     Advanced Directives:   [x] Full code  [ ] DNR  [ ] Hospice    Discharge Diagnoses:  gastroenteritis + Norovirus  Elevated LFTs

## 2025-03-26 NOTE — DISCHARGE NOTE PROVIDER - NSDCCPCAREPLAN_GEN_ALL_CORE_FT
PRINCIPAL DISCHARGE DIAGNOSIS  Diagnosis: Norovirus  Assessment and Plan of Treatment: improved with supportive care, liver enzymes improving. f/u with PCP in 1-2 weeks

## 2025-03-26 NOTE — DISCHARGE NOTE NURSING/CASE MANAGEMENT/SOCIAL WORK - NSDCPEFALRISK_GEN_ALL_CORE
For information on Fall & Injury Prevention, visit: [email or web address] OR  [email or web address] OR  https://www.cdc.gov/steadi/patient.html

## 2025-03-26 NOTE — DISCHARGE NOTE PROVIDER - CARE PROVIDER_API CALL
Uche López L  Pulmonary Disease  [address] [place] [address]  Phone: [phone]  Fax: [phone]  Established Patient  Follow Up Time: 2 weeks

## 2025-03-26 NOTE — DISCHARGE NOTE PROVIDER - NSDCMRMEDTOKEN_GEN_ALL_CORE_FT
aluminum hydroxide-magnesium hydroxide 200 mg-200 mg/5 mL oral suspension: 30 milliliter(s) orally every 4 hours as needed for Dyspepsia  Protonix 40 mg oral delayed release tablet: 1 tab(s) orally once a day

## 2025-03-26 NOTE — DISCHARGE NOTE NURSING/CASE MANAGEMENT/SOCIAL WORK - PATIENT PORTAL LINK FT
You can access the FollowMyHealth Patient Portal offered by [facility] by registering at the following website: [email or web address]. By joining [facility]’s FollowMyHealth portal, you will also be able to view your health information using other applications (apps) compatible with our system.

## 2025-03-26 NOTE — PROGRESS NOTE ADULT - ATTENDING COMMENTS
Patient doing well with benign exam  Diarrhea 2/2 Norovirus -> improving symptoms and now tolerating full diet and diarrhea manageable  Elevated LAEs -> improving AST/ALT/Alk P.  Likely 2/2 dehydration.  Unclear role of over the counter herbal remedy    No barriers to discharge from GI standpoint  No further testing from GI standpoint currently  c/w supportive care  GI team to s/o at this time

## 2025-03-26 NOTE — PROGRESS NOTE ADULT - SUBJECTIVE AND OBJECTIVE BOX
House GI Progress Note    Chief Complaint:  Patient is a 59y old  Female who presents with a chief complaint of diarrhea (25 Mar 2025 13:20)      Interval Events / Subjective:       REVIEW OF SYSTEMS:   General: No fever, chills, night sweats, weight loss or fatigue  HEENT: No vision changes, vertigo, sore throat, nasal congestion  CV: No chest pain, palpitations, orthopnea or PND  Respiratory: No dyspnea, cough, wheezing  GI: See HPI  : No hematuria, dysuria, incontinence or nocturia  MSK: No joint pain, swelling or stiffness; no muscle pain or weakness  Neuro: No headache, focal weakness, paresthesias  Psych: No depressed mood, anxiety or insomnia  Endocrine: No polyuria, polydipsia, cold/heat intolerance  Hematologic: No petechiae, ecchymoses or lymphadenopathy  Skin: No rashes or lesions    MEDICATIONS:   MEDICATIONS  (STANDING):  lactated ringers. 1000 milliLiter(s) (75 mL/Hr) IV Continuous <Continuous>  pantoprazole  Injectable 40 milliGRAM(s) IV Push every 12 hours  sodium chloride 0.9%. 1000 milliLiter(s) (75 mL/Hr) IV Continuous <Continuous>    MEDICATIONS  (PRN):  acetaminophen     Tablet .. 650 milliGRAM(s) Oral every 6 hours PRN Temp greater or equal to 38C (100.4F), Mild Pain (1 - 3)  aluminum hydroxide/magnesium hydroxide/simethicone Suspension 30 milliLiter(s) Oral every 4 hours PRN Dyspepsia  melatonin 3 milliGRAM(s) Oral at bedtime PRN Insomnia  ondansetron Injectable 4 milliGRAM(s) IV Push every 8 hours PRN Nausea and/or Vomiting      ALLERGIES:   Allergies    No Known Allergies    Intolerances        VITAL SIGNS:   Vital Signs Last 24 Hrs  T(C): 36.8 (26 Mar 2025 09:42), Max: 37.1 (25 Mar 2025 20:29)  T(F): 98.3 (26 Mar 2025 09:42), Max: 98.8 (25 Mar 2025 20:29)  HR: 74 (26 Mar 2025 09:42) (68 - 74)  BP: 121/70 (26 Mar 2025 09:42) (104/65 - 121/70)  BP(mean): --  RR: 18 (26 Mar 2025 09:42) (18 - 18)  SpO2: 94% (26 Mar 2025 09:42) (93% - 94%)    Parameters below as of 26 Mar 2025 09:42  Patient On (Oxygen Delivery Method): room air      I&O's Summary    25 Mar 2025 07:01  -  26 Mar 2025 07:00  --------------------------------------------------------  IN: 480 mL / OUT: 5 mL / NET: 475 mL    26 Mar 2025 07:01  -  26 Mar 2025 10:54  --------------------------------------------------------  IN: 240 mL / OUT: 0 mL / NET: 240 mL        PHYSICAL EXAM:   GENERAL:  Appears stated age, well-groomed, well-nourished, no distress  HEENT:  NC/AT,  conjunctivae clear, sclera -anicteric  CHEST:  Full & symmetric excursion, no increased effort, breath sounds clear  HEART:  Regular rhythm, S1, S2, no murmur/rub/S3/S4,  no edema  ABDOMEN:  Soft, non-tender, non-distended, normoactive bowel sounds,  no masses, no hepato-splenomegaly,   EXTREMITIES: No cyanosis,clubbing or edema  SKIN:  No rash/erythema/ecchymoses/petechiae/wounds/abscess/warm/dry  NEURO:  Alert, oriented    LABS:  CBC Full  -  ( 26 Mar 2025 07:10 )  WBC Count : 5.30 K/uL  RBC Count : 4.09 M/uL  Hemoglobin : 12.4 g/dL  Hematocrit : 35.8 %  Platelet Count - Automated : 217 K/uL  Mean Cell Volume : 87.5 fl  Mean Cell Hemoglobin : 30.3 pg  Mean Cell Hemoglobin Concentration : 34.6 g/dL  Auto Neutrophil # : x  Auto Lymphocyte # : x  Auto Monocyte # : x  Auto Eosinophil # : x  Auto Basophil # : x  Auto Neutrophil % : x  Auto Lymphocyte % : x  Auto Monocyte % : x  Auto Eosinophil % : x  Auto Basophil % : x    03-26    143  |  108  |  6[L]  ----------------------------<  84  3.8   |  23  |  0.69    Ca    8.7      26 Mar 2025 07:10  Mg     2.1     03-26    TPro  5.8[L]  /  Alb  3.5  /  TBili  0.4  /  DBili  0.2  /  AST  184[H]  /  ALT  294[H]  /  AlkPhos  112  03-26    LIVER FUNCTIONS - ( 26 Mar 2025 07:10 )  Alb: 3.5 g/dL / Pro: 5.8 g/dL / ALK PHOS: 112 U/L / ALT: 294 U/L / AST: 184 U/L / GGT: x           PT/INR - ( 24 Mar 2025 20:06 )   PT: 11.9 sec;   INR: 1.04 ratio         PTT - ( 24 Mar 2025 20:06 )  PTT:31.3 sec  Urinalysis Basic - ( 26 Mar 2025 07:10 )    Color: x / Appearance: x / SG: x / pH: x  Gluc: 84 mg/dL / Ketone: x  / Bili: x / Urobili: x   Blood: x / Protein: x / Nitrite: x   Leuk Esterase: x / RBC: x / WBC x   Sq Epi: x / Non Sq Epi: x / Bacteria: x        Culture - Blood (collected 24 Mar 2025 21:10)  Source: Blood Blood  Preliminary Report (26 Mar 2025 02:02):    No growth at 24 hours    Culture - Blood (collected 24 Mar 2025 19:48)  Source: Blood Blood  Preliminary Report (26 Mar 2025 01:02):    No growth at 24 hours        IMAGING:   House GI Progress Note    Chief Complaint:  Patient is a 59y old  Female who presents with a chief complaint of diarrhea (25 Mar 2025 13:20)      Interval Events / Subjective: Pt reports improvement in symptoms. Had 2 episodes nonbloody diarrhea today but more solid than before. No abdominal pain, able to tolerate solids.      REVIEW OF SYSTEMS:   General: No fever, chills, night sweats, weight loss or fatigue  HEENT: No vision changes, vertigo, sore throat, nasal congestion  CV: No chest pain, palpitations, orthopnea or PND  Respiratory: No dyspnea, cough, wheezing  GI: See HPI  : No hematuria, dysuria, incontinence or nocturia  MSK: No joint pain, swelling or stiffness; no muscle pain or weakness  Neuro: No headache, focal weakness, paresthesias  Psych: No depressed mood, anxiety or insomnia  Endocrine: No polyuria, polydipsia, cold/heat intolerance  Hematologic: No petechiae, ecchymoses or lymphadenopathy  Skin: No rashes or lesions    MEDICATIONS:   MEDICATIONS  (STANDING):  lactated ringers. 1000 milliLiter(s) (75 mL/Hr) IV Continuous <Continuous>  pantoprazole  Injectable 40 milliGRAM(s) IV Push every 12 hours  sodium chloride 0.9%. 1000 milliLiter(s) (75 mL/Hr) IV Continuous <Continuous>    MEDICATIONS  (PRN):  acetaminophen     Tablet .. 650 milliGRAM(s) Oral every 6 hours PRN Temp greater or equal to 38C (100.4F), Mild Pain (1 - 3)  aluminum hydroxide/magnesium hydroxide/simethicone Suspension 30 milliLiter(s) Oral every 4 hours PRN Dyspepsia  melatonin 3 milliGRAM(s) Oral at bedtime PRN Insomnia  ondansetron Injectable 4 milliGRAM(s) IV Push every 8 hours PRN Nausea and/or Vomiting      ALLERGIES:   Allergies    No Known Allergies    Intolerances        VITAL SIGNS:   Vital Signs Last 24 Hrs  T(C): 36.8 (26 Mar 2025 09:42), Max: 37.1 (25 Mar 2025 20:29)  T(F): 98.3 (26 Mar 2025 09:42), Max: 98.8 (25 Mar 2025 20:29)  HR: 74 (26 Mar 2025 09:42) (68 - 74)  BP: 121/70 (26 Mar 2025 09:42) (104/65 - 121/70)  BP(mean): --  RR: 18 (26 Mar 2025 09:42) (18 - 18)  SpO2: 94% (26 Mar 2025 09:42) (93% - 94%)    Parameters below as of 26 Mar 2025 09:42  Patient On (Oxygen Delivery Method): room air      I&O's Summary    25 Mar 2025 07:01  -  26 Mar 2025 07:00  --------------------------------------------------------  IN: 480 mL / OUT: 5 mL / NET: 475 mL    26 Mar 2025 07:01  -  26 Mar 2025 10:54  --------------------------------------------------------  IN: 240 mL / OUT: 0 mL / NET: 240 mL        PHYSICAL EXAM:   GENERAL:  Appears stated age, well-groomed, well-nourished, no distress  HEENT:  NC/AT,  conjunctivae clear, sclera -anicteric  CHEST:  Full & symmetric excursion, no increased effort, breath sounds clear  HEART:  Regular rhythm, S1, S2, no murmur/rub/S3/S4,  no edema  ABDOMEN:  Soft, non-tender, non-distended, normoactive bowel sounds,  no masses, no hepato-splenomegaly,   EXTREMITIES: No cyanosis,clubbing or edema  SKIN:  No rash/erythema/ecchymoses/petechiae/wounds/abscess/warm/dry  NEURO:  Alert, oriented    LABS:  CBC Full  -  ( 26 Mar 2025 07:10 )  WBC Count : 5.30 K/uL  RBC Count : 4.09 M/uL  Hemoglobin : 12.4 g/dL  Hematocrit : 35.8 %  Platelet Count - Automated : 217 K/uL  Mean Cell Volume : 87.5 fl  Mean Cell Hemoglobin : 30.3 pg  Mean Cell Hemoglobin Concentration : 34.6 g/dL  Auto Neutrophil # : x  Auto Lymphocyte # : x  Auto Monocyte # : x  Auto Eosinophil # : x  Auto Basophil # : x  Auto Neutrophil % : x  Auto Lymphocyte % : x  Auto Monocyte % : x  Auto Eosinophil % : x  Auto Basophil % : x    03-26    143  |  108  |  6[L]  ----------------------------<  84  3.8   |  23  |  0.69    Ca    8.7      26 Mar 2025 07:10  Mg     2.1     03-26    TPro  5.8[L]  /  Alb  3.5  /  TBili  0.4  /  DBili  0.2  /  AST  184[H]  /  ALT  294[H]  /  AlkPhos  112  03-26    LIVER FUNCTIONS - ( 26 Mar 2025 07:10 )  Alb: 3.5 g/dL / Pro: 5.8 g/dL / ALK PHOS: 112 U/L / ALT: 294 U/L / AST: 184 U/L / GGT: x           PT/INR - ( 24 Mar 2025 20:06 )   PT: 11.9 sec;   INR: 1.04 ratio         PTT - ( 24 Mar 2025 20:06 )  PTT:31.3 sec  Urinalysis Basic - ( 26 Mar 2025 07:10 )    Color: x / Appearance: x / SG: x / pH: x  Gluc: 84 mg/dL / Ketone: x  / Bili: x / Urobili: x   Blood: x / Protein: x / Nitrite: x   Leuk Esterase: x / RBC: x / WBC x   Sq Epi: x / Non Sq Epi: x / Bacteria: x        Culture - Blood (collected 24 Mar 2025 21:10)  Source: Blood Blood  Preliminary Report (26 Mar 2025 02:02):    No growth at 24 hours    Culture - Blood (collected 24 Mar 2025 19:48)  Source: Blood Blood  Preliminary Report (26 Mar 2025 01:02):    No growth at 24 hours        IMAGING:

## 2025-03-26 NOTE — DISCHARGE NOTE PROVIDER - PREFACE STATEMENT FOR MINUTES SPENT
Fiber-Restricted Diet    This guide has been prepared for your use by registered dietitians.  If you have questions or concerns, please call the nearest [place] facility to contact a dietitian. Diet counseling is available to discuss your speci?c needs.    Why follow a fiber-restricted diet?  Decrease the amount of fiber in your diet temporarily if you have diarrhea and abdominal cramping. Gradually add foods back once symptoms disappear or follow your doctor’s advice.    This diet can be used during acute phases of ulcerative colitis, Crohn’s disease and diverticulitis.  It also can be used before surgery to reduce stool quantity and after surgery before you progress to a general diet.    Important points   Avoid any food made with seeds, nuts and raw or dried fruit    When symptoms disappear, gradually add higher-fiber foods back into your diet   Ask your doctor if a multivitamin and mineral supplements are necessary   Eat foods containing prebiotics and probiotics, such as yogurt with active cultures and kefir   Drink at least 8 cups of liquids each day to prevent dehydration      Food groups Foods Recommended  May not be tolerated Tips   Breads, cereals, rice and pasta      5 to 7 servings per day    1 serving =    1 slice of bread  1 cup ready-to-eat cereal  1/2 cup cooked cereal, rice   or pasta  1/2 bagel, bun or    English muffin Refined breads, rolls,   biscuits, muffins,   crackers, pancakes,    waffles and plain pastries  Refined cooked cereals,   including quick-cooking   oatmeal, grits and farina  Refined cold cereals,   including puffed rice,   puffed wheat and corn   Flakes  Cooked white and sweet   potatoes without skin  White rice and refined pasta Any bread product made   with whole-grain flour,   bran, seeds, nuts, coconut,   or raw dried fruit    Cornbread, lulu   Crackers    Regular oatmeal; any   whole-grain, bran or   granola cereal    Any cereal with seeds,   nuts, coconut or dried    Fruit    Brown or wild rice,   whole grain pasta Buy breads and cereals   made from refined wheat   and rice  Avoid whole-grain   products with added bran   Food groups Foods Recommended  May not be tolerated Tips   Vegetables      2 to 3 cups per day Most well-cooked and   canned vegetables   without seeds, lettuce if tolerated  Strained vegetable juice Sauerkraut, winter   squash, peas and corn,   most raw vegetables and   vegetables with seeds Remove skin from   vegetables and fruits   before cooking  Strong-flavored   vegetables, such as   cabbage, broccoli and   onions, may cause   discomfort   Fruits      1 1/2 to 2 cups per day Most canned or cooked   fruits, including   applesauce, fruit cocktail,   ripe bananas, melons,   peeled apples, and orange   and grapefruit sections   with membranes removed  Strained fruit juice/fruit   drinks Raw or dried fruit    All berries    Prune juice, prunes    Milk, yogurt and cheese      3 cups per day  1 cup =  1 cup milk or yogurt  1 1/2 oz. natural cheese  2 oz. processed cheese Milk (as tolerated), kefir,   lactaid milk, cheese,   cottage cheese, pudding   and yogurt (plain or   flavored) Cheese with seeds or   nuts; pudding with nuts;   yogurt containing nuts or   fruit peels You may need to limit   milk and milk products if   you have discomfort  Choose yogurt with   active cultures   Meats, fish, poultry, dry beans and peas, eggs and nuts        5 to 7 ounces per day Ground or well-cooked   tender beef, lamb, ham,   veal, pork, poultry, fish   and organ meats    Eggs    Creamy peanut butter Any meat prepared with   whole grain ingredients,   seeds or nuts    Dry beans, peas and   lentils, legumes, baked   Beans    [place] peanut butter           Marinate meats in juice   or wine for added flavor   Food groups Foods   Recommended May not be tolerated Tips   Fats, snacks, sweets, condiments and beverages        Use sparingly Margarine, butter,   vegetable oils, salad    dressings, mayonnaise,   santamaria, plain gravies    Bouillon, broth or cream   soups made with allowed   vegetables, noodles, rice   or flour    Plain cakes and cookies;   pie made with allowed   Fruits    Plain sherbet, fruit ice,   frozen fruit pops, gelatin   and custard; ice cream as   tolerated     Jelly, seedless jam,   plain hard candy and   marshmallows, molasses,   sugar and syrup    Salt, pepper, vinegar,   ketchup and mustard    Coffee/tea as tolerated,   carbonated beverages       All other soups        Any desserts and sweets   made with whole grain   flour, bran, seeds, nuts,   coconut or dried fruit        Jams and marmalades  with seeds          Nuts, seeds, popcorn  and pickles       Season with spices and   herbs for added flavor   and variety          Consult your doctor   about the use of alcoholic   beverages     A registered dietitian can help  Diet counseling with a registered dietitian may include information about:   Label reading, shopping, preparing food, dining out and adjusting recipes   Combining other diet restrictions if needed    For a list of [facility] with a dietitian, please call [facility] toll free at [phone].   I personally spent

## 2025-03-27 LAB — SMOOTH MUSCLE AB SER-ACNC: SIGNIFICANT CHANGE UP
